# Patient Record
Sex: FEMALE | Race: ASIAN | Employment: FULL TIME | URBAN - METROPOLITAN AREA
[De-identification: names, ages, dates, MRNs, and addresses within clinical notes are randomized per-mention and may not be internally consistent; named-entity substitution may affect disease eponyms.]

---

## 2017-03-01 ENCOUNTER — TRANSFERRED RECORDS (OUTPATIENT)
Dept: HEALTH INFORMATION MANAGEMENT | Facility: CLINIC | Age: 57
End: 2017-03-01

## 2017-03-02 ENCOUNTER — TELEPHONE (OUTPATIENT)
Dept: FAMILY MEDICINE | Facility: CLINIC | Age: 57
End: 2017-03-02

## 2017-03-02 NOTE — LETTER
April 5, 2017      Veronika Lucia  [address] [facility] [address]        Dear Ms. Veronika Rea,    After your last visit we sent a Release of Information to [facility] to get your colonoscopy result.  They sent us a response that you have not been seen by them and so no result is available.  Could you please try to remember or find out where you had the test done.  If you could either let us know or contact them to gets the results we would appreciate it.    Thanks for your time and consideration in this matter.     Sincerely,     Mechelle Montgomery MD

## 2017-03-02 NOTE — TELEPHONE ENCOUNTER
Call Regarding Preventive Health Screening Colonoscopy    Attempt 1    Message on voicemail     Comments:       Outreach   Renae Morrison

## 2017-03-17 ENCOUNTER — HOSPITAL ENCOUNTER (OUTPATIENT)
Dept: MAMMOGRAPHY | Facility: CLINIC | Age: 57
Discharge: HOME | End: 2017-03-17
Attending: INTERNAL MEDICINE | Admitting: INTERNAL MEDICINE
Payer: COMMERCIAL

## 2017-03-17 DIAGNOSIS — Z12.31 ENCOUNTER FOR SCREENING MAMMOGRAM FOR HIGH-RISK PATIENT: ICD-10-CM

## 2017-03-17 PROCEDURE — G0202 SCR MAMMO BI INCL CAD: HCPCS | Mod: 52

## 2017-03-28 ENCOUNTER — OFFICE VISIT (OUTPATIENT)
Dept: FAMILY MEDICINE | Facility: CLINIC | Age: 57
End: 2017-03-28
Payer: COMMERCIAL

## 2017-03-28 VITALS
SYSTOLIC BLOOD PRESSURE: 122 MMHG | DIASTOLIC BLOOD PRESSURE: 68 MMHG | HEIGHT: 58 IN | WEIGHT: 127.7 LBS | RESPIRATION RATE: 15 BRPM | BODY MASS INDEX: 26.8 KG/M2 | OXYGEN SATURATION: 100 % | TEMPERATURE: 97.6 F | HEART RATE: 71 BPM

## 2017-03-28 DIAGNOSIS — M81.0 OSTEOPOROSIS: ICD-10-CM

## 2017-03-28 DIAGNOSIS — Z00.00 ROUTINE GENERAL MEDICAL EXAMINATION AT A HEALTH CARE FACILITY: Primary | ICD-10-CM

## 2017-03-28 DIAGNOSIS — E03.9 HYPOTHYROIDISM, UNSPECIFIED TYPE: ICD-10-CM

## 2017-03-28 DIAGNOSIS — K21.9 GASTROESOPHAGEAL REFLUX DISEASE, ESOPHAGITIS PRESENCE NOT SPECIFIED: ICD-10-CM

## 2017-03-28 DIAGNOSIS — Z85.3 PERSONAL HISTORY OF MALIGNANT NEOPLASM OF BREAST: ICD-10-CM

## 2017-03-28 DIAGNOSIS — Z13.6 CARDIOVASCULAR SCREENING; LDL GOAL LESS THAN 160: ICD-10-CM

## 2017-03-28 DIAGNOSIS — K22.70 BARRETT'S ESOPHAGUS WITHOUT DYSPLASIA: ICD-10-CM

## 2017-03-28 DIAGNOSIS — C50.911 MALIGNANT NEOPLASM OF RIGHT FEMALE BREAST, UNSPECIFIED SITE OF BREAST: ICD-10-CM

## 2017-03-28 DIAGNOSIS — M25.562 LEFT KNEE PAIN, UNSPECIFIED CHRONICITY: ICD-10-CM

## 2017-03-28 LAB
ALBUMIN SERPL-MCNC: 3.8 G/DL (ref 3.4–5)
ALP SERPL-CCNC: 82 U/L (ref 40–150)
ALT SERPL W P-5'-P-CCNC: 20 U/L (ref 0–50)
ANION GAP SERPL CALCULATED.3IONS-SCNC: 8 MMOL/L (ref 3–14)
AST SERPL W P-5'-P-CCNC: 14 U/L (ref 0–45)
BILIRUB SERPL-MCNC: 0.3 MG/DL (ref 0.2–1.3)
BUN SERPL-MCNC: 10 MG/DL (ref 7–30)
CALCIUM SERPL-MCNC: 9.2 MG/DL (ref 8.5–10.1)
CHLORIDE SERPL-SCNC: 107 MMOL/L (ref 94–109)
CHOLEST SERPL-MCNC: 216 MG/DL
CO2 SERPL-SCNC: 26 MMOL/L (ref 20–32)
CREAT SERPL-MCNC: 0.76 MG/DL (ref 0.52–1.04)
GFR SERPL CREATININE-BSD FRML MDRD: 77 ML/MIN/1.7M2
GLUCOSE SERPL-MCNC: 92 MG/DL (ref 70–99)
HDLC SERPL-MCNC: 40 MG/DL
LDLC SERPL CALC-MCNC: 131 MG/DL
NONHDLC SERPL-MCNC: 176 MG/DL
POTASSIUM SERPL-SCNC: 3.9 MMOL/L (ref 3.4–5.3)
PROT SERPL-MCNC: 7.7 G/DL (ref 6.8–8.8)
SODIUM SERPL-SCNC: 141 MMOL/L (ref 133–144)
T4 FREE SERPL-MCNC: 1.14 NG/DL (ref 0.76–1.46)
TRIGL SERPL-MCNC: 227 MG/DL
TSH SERPL DL<=0.05 MIU/L-ACNC: 1.84 MU/L (ref 0.4–4)

## 2017-03-28 PROCEDURE — 84443 ASSAY THYROID STIM HORMONE: CPT | Performed by: INTERNAL MEDICINE

## 2017-03-28 PROCEDURE — 99396 PREV VISIT EST AGE 40-64: CPT | Performed by: INTERNAL MEDICINE

## 2017-03-28 PROCEDURE — 80061 LIPID PANEL: CPT | Performed by: INTERNAL MEDICINE

## 2017-03-28 PROCEDURE — 84439 ASSAY OF FREE THYROXINE: CPT | Performed by: INTERNAL MEDICINE

## 2017-03-28 PROCEDURE — 36415 COLL VENOUS BLD VENIPUNCTURE: CPT | Performed by: INTERNAL MEDICINE

## 2017-03-28 PROCEDURE — 99213 OFFICE O/P EST LOW 20 MIN: CPT | Mod: 25 | Performed by: INTERNAL MEDICINE

## 2017-03-28 PROCEDURE — 80053 COMPREHEN METABOLIC PANEL: CPT | Performed by: INTERNAL MEDICINE

## 2017-03-28 RX ORDER — LEVOTHYROXINE SODIUM 75 UG/1
75 TABLET ORAL DAILY
Qty: 90 TABLET | Refills: 1 | Status: SHIPPED | OUTPATIENT
Start: 2017-03-28 | End: 2017-10-09

## 2017-03-28 NOTE — NURSING NOTE
"Chief Complaint   Patient presents with     Physical     Thyroid Problem       Initial /68 (BP Location: Left arm, Patient Position: Chair, Cuff Size: Adult Large)  Pulse 71  Temp 97.6  F (36.4  C) (Oral)  Resp 15  Ht 4' 10\" (1.473 m)  Wt 127 lb 11.2 oz (57.9 kg)  SpO2 100%  BMI 26.69 kg/m2 Estimated body mass index is 26.69 kg/(m^2) as calculated from the following:    Height as of this encounter: 4' 10\" (1.473 m).    Weight as of this encounter: 127 lb 11.2 oz (57.9 kg).  Medication Reconciliation: complete    "

## 2017-03-28 NOTE — MR AVS SNAPSHOT
After Visit Summary   3/28/2017    Veronika Lucia    MRN: [id number]           Patient Information     Date Of Birth          1960        Visit Information        Provider Department      3/28/2017 7:30 AM Mechelle Montgomery MD [facility]        Today's Diagnoses     Routine general medical examination at a health care facility    -  1    Hypothyroidism, unspecified type        Gastroesophageal reflux disease, esophagitis presence not specified        Curran's esophagus without dysplasia        Personal history of malignant neoplasm of breast        CARDIOVASCULAR SCREENING; LDL GOAL LESS THAN 160        Left knee pain, unspecified chronicity        Osteoporosis          Care Instructions      Preventive Health Recommendations  Female Ages 50 - 64    Yearly exam: See your health care provider every year in order to  o Review health changes.   o Discuss preventive care.    o Review your medicines if your doctor has prescribed any.      Get a Pap test every three years (unless you have an abnormal result and your provider advises testing more often).    If you get Pap tests with HPV test, you only need to test every 5 years, unless you have an abnormal result.     You do not need a Pap test if your uterus was removed (hysterectomy) and you have not had cancer.    You should be tested each year for STDs (sexually transmitted diseases) if you're at risk.     Have a mammogram every 1 to 2 years.    Have a colonoscopy at age 50, or have a yearly FIT test (stool test). These exams screen for colon cancer.      Have a cholesterol test every 5 years, or more often if advised.    Have a diabetes test (fasting glucose) every three years. If you are at risk for diabetes, you should have this test more often.     If you are at risk for osteoporosis (brittle bone disease), think about having a bone density scan (DEXA).    Shots: Get a flu shot each year. Get a tetanus shot every 10  years.    Nutrition:     Eat at least 5 servings of fruits and vegetables each day.    Eat whole-grain bread, whole-wheat pasta and brown rice instead of white grains and rice.    Talk to your provider about Calcium and Vitamin D.     Lifestyle    Exercise at least 150 minutes a week (30 minutes a day, 5 days a week). This will help you control your weight and prevent disease.    Limit alcohol to one drink per day.    No smoking.     Wear sunscreen to prevent skin cancer.     See your dentist every six months for an exam and cleaning.    See your eye doctor every 1 to 2 years.          Follow-ups after your visit        Future tests that were ordered for you today     Open Future Orders        Priority Expected Expires Ordered    DX Hip/Pelvis/Spine Routine  3/28/2018 3/28/2017            Who to contact     If you have questions or need follow up information about today's clinic visit or your schedule please contact [facility] directly at [phone].  Normal or non-critical lab and imaging results will be communicated to you by [employer], letter or phone within 4 business days after the clinic has received the results. If you do not hear from us within 7 days, please contact the clinic through [employer] or phone. If you have a critical or abnormal lab result, we will notify you by phone as soon as possible.  Submit refill requests through [employer] or call your pharmacy and they will forward the refill request to us. Please allow 3 business days for your refill to be completed.          Additional Information About Your Visit        [employer] Information     [employer] gives you secure access to your electronic health record. If you see a primary care provider, you can also send messages to your care team and make appointments. If you have questions, please call your primary care clinic.  If you do not have a primary care provider, please call [phone] and they will assist you.        Care EveryWhere ID   "   This is your Care EveryWhere ID. This could be used by other organizations to access your [facility] medical records  [id number]        Your Vitals Were     Pulse Temperature Respirations Height Pulse Oximetry BMI (Body Mass Index)    71 97.6  F (36.4  C) (Oral) 15 4' 10\" (1.473 m) 100% 26.69 kg/m2       Blood Pressure from Last 3 Encounters:   03/28/17 122/68   03/15/16 120/70   12/16/15 110/63    Weight from Last 3 Encounters:   03/28/17 127 lb 11.2 oz (57.9 kg)   03/15/16 130 lb 4.8 oz (59.1 kg)   12/16/15 128 lb (58.1 kg)              We Performed the Following     Comprehensive metabolic panel     Lipid panel reflex to direct LDL     T4, free     TSH          Today's Medication Changes          These changes are accurate as of: 3/28/17  8:14 AM.  If you have any questions, ask your nurse or doctor.               Stop taking these medicines if you haven't already. Please contact your care team if you have questions.     omeprazole 20 MG tablet   Stopped by:  Mechelle Montgomery MD                Where to get your medicines      These medications were sent to [facility] [address] [facility] [address] [facility] [address] [facility] [address] [facility]  [address] [facility] [address]     Phone:  [phone]     levothyroxine 75 MCG tablet                Primary Care Provider Office Phone # Fax #    Mechelle Montgomery [phone]       [facility] [address] [address] [facility] [address]        Thank you!     Thank you for choosing [facility]  for your care. Our goal is always to provide you with excellent care. Hearing back from our patients is one way we can continue to improve our services. Please take a few minutes to complete the written survey that you may receive in the mail after your visit with us. Thank you!             Your Updated Medication List - Protect others around you: Learn how to safely use, store and throw away your " medicines at www.disposemymeds.org.          This list is accurate as of: 3/28/17  8:14 AM.  Always use your most recent med list.                   Brand Name Dispense Instructions for use    cholecalciferol 5000 UNITS Caps capsule    vitamin D3    100 capsule    Take 1 capsule (5,000 Units) by mouth daily       LANSOPRAZOLE PO      Take 30 mg by mouth every morning (before breakfast)       levothyroxine 75 MCG tablet    SYNTHROID/LEVOTHROID    90 tablet    Take 1 tablet (75 mcg) by mouth daily

## 2017-03-28 NOTE — PROGRESS NOTES
SUBJECTIVE:     CC: Veronika Lucia is an 56 year old woman who presents for preventive health visit.   She also has several chronic conditions to follow up, monitor medications, labs and  further evaluation ( EGD).    Healthy Habits:    Do you get at least three servings of calcium containing foods daily (dairy, green leafy vegetables, etc.)? Yes     Amount of exercise or daily activities, outside of work: 0 day(s) per week    Problems taking medications regularly No    Medication side effects: No    Have you had an eye exam in the past two years? Yes     Do you see a dentist twice per year? Yes  Do you have sleep apnea, excessive snoring or daytime drowsiness? No     Barretts Esophagus  EGD 3/13/15 - gastritis and Curran's esophagus detected, was recommended repeat EGD 1 year, POSITIVE for intermittent gas and pain to chest prior to eating - use of Prevacid helpful, intermittent use of Tums after eating certain foods    Hypothyroidism Follow-up      Since last visit, patient describes the following symptoms: Weight stable, no hair loss, no skin changes, no constipation, no loose stools  TSH   Date Value Ref Range Status   03/15/2016 1.83 0.40 - 4.00 mU/L Final     T4 Free   Date Value Ref Range Status   03/15/2016 1.21 0.76 - 1.46 ng/dL Final        Today's PHQ-2 Score:   PHQ-2 ( 1999 [employer]) 3/28/2017 3/15/2016   Q1: Little interest or pleasure in doing things 0 0   Q2: Feeling down, depressed or hopeless 0 0   PHQ-2 Score 0 0   Abuse: Current or Past (Physical, Sexual or Emotional) - No  Do you feel safe in your environment - Yes    Social History   Substance Use Topics     Smoking status: Never Smoker     Smokeless tobacco: Never Used     Alcohol use No     The patient does not drink >3 drinks per day nor >7 drinks per week.    Recent Labs   Lab Test  03/15/16   0843  02/12/15   0849  02/14/14   0807   CHOL  215*  252*  230*   HDL  43*  56  46*   LDL  129*  150*  151*   TRIG  215*  228*  165*   CHOLHDLRATIO    --   4.5  5.0   [facility]  172*   --    --      Reviewed orders with patient. Reviewed health maintenance and updated orders accordingly - Yes    Mammo Decision Support:  Patient over age 50, mutual decision to screen reflected in health maintenance.  Pertinent mammograms are reviewed under the imaging tab.    History of abnormal Pap smear: Status post benign hysterectomy. Health Maintenance and Surgical History updated.    Reviewed and updated as needed this visit by clinical staff  Tobacco  Allergies  Meds  Problems  Med Hx  Surg Hx  Fam Hx  Soc Hx        Reviewed and updated as needed this visit by Provider  Allergies  Meds  Problems        Past Medical History:   Diagnosis Date     Breast cancer (H) 2007    right mastectomy, Tamoxifen for 5 years; ; Dr. Denton     DVT (deep venous thrombosis) (H) 2009    left distal leg; following fracture/immobility; was also on Tamoxifen.     Thyroid disease 2000    Replacement therapy      Past Surgical History:   Procedure Laterality Date     HYSTERECTOMY TOTAL ABDOMINAL, BILATERAL SALPINGO-OOPHORECTOMY, COMBINED  2010    Dr. Turner fibroids; [facility]     obgyn  2007    right sided mastectomy      ROS:   CONSTITUTIONAL: NEGATIVE for fever, chills, change in weight  ENT: NEGATIVE for ear, mouth and throat problems  RESP: NEGATIVE for significant cough or SOB  BREAST: NEGATIVE for masses, tenderness or discharge  CV: NEGATIVE for chest pain, palpitations or peripheral edema  GI: EGD 3/13/15 - gastritis and Curran's esophagus detected, was recommended repeat EGD 1 year, POSITIVE for intermittent gas and pain to chest prior to eating - use of Prevacid helpful, intermittent use of Tums after eating certain foods, NEGATIVE for nausea, abdominal pain, or change in bowel habits  MUSCULOSKELETAL: POSITIVE for intermittent left knee pain with radiation to calf, NEGATIVE for significant arthralgias or myalgia  NEURO: NEGATIVE for weakness, dizziness or paresthesias  ENDOCRINE:  "Hypothyroidism - use of levothyroxine, NEGATIVE for temperature intolerance, skin/hair changes  HEME/ALLERGY/IMMUNE: NEGATIVE for bleeding problems  PSYCHIATRIC: NEGATIVE for changes in mood or affect     This document serves as a record of the services and decisions personally performed and made by Mechelle Montgomery MD. It was created on her behalf by Verona Hathaway, a trained medical scribe. The creation of this document is based on the provider's statements to the medical scribe.   Vernoa Hathaway, 7:54 AM, March 28, 2017    Problem list, Medication list, Allergies, and Medical/Social/Surgical histories reviewed in [facility] and updated as appropriate.  Labs reviewed in EPIC  BP Readings from Last 3 Encounters:   03/28/17 122/68   03/15/16 120/70   12/16/15 110/63    Wt Readings from Last 3 Encounters:   03/28/17 127 lb 11.2 oz (57.9 kg)   03/15/16 130 lb 4.8 oz (59.1 kg)   12/16/15 128 lb (58.1 kg)        OBJECTIVE:     /68 (BP Location: Left arm, Patient Position: Chair, Cuff Size: Adult Large)  Pulse 71  Temp 97.6  F (36.4  C) (Oral)  Resp 15  Ht 4' 10\" (1.473 m)  Wt 127 lb 11.2 oz (57.9 kg)  SpO2 100%  BMI 26.69 kg/m2  EXAM:  GENERAL APPEARANCE: healthy, alert and no distress  HENT: ear canals and TM's normal, nose and mouth without ulcers or lesions, oral mucous membranes moist and oropharynx clear  NECK: no adenopathy, thyroid normal to palpation and no bruits  RESP: lungs clear to auscultation - no rales, rhonchi or wheezes  BREAST: right sided chest wall consistent with mastectomy; left breast and chest wall is normal without masses, tenderness or nipple discharge and no palpable axillary masses or adenopathy  CV: regular rates and rhythm and normal S1 S2, no peripheral edema  LYMPHATICS: normal ant/post cervical and supraclavicular nodes  ABDOMEN: soft, nontender, without hepatosplenomegaly or masses and bowel sounds normal  MS: mild increased tone to left back, FROM to left knee, no effusion, extremities " normal- no gross deformities noted, normal alignment on forward bending, straight leg raise negative bilaterally  NEURO: Normal strength and tone, mentation intact, speech normal, gait normal including heel/toe/tandem walking and Romberg negative  PSYCH: mentation appears normal and affect normal/bright    ASSESSMENT/PLAN:     (Z00.00) Routine general medical examination at a health care facility (primary encounter diagnosis)  Comment: HEALTH CARE MAINTENANCE and immunizations reviewed, clinical breast exam completed, annual mammography and breast exams, recent colonoscopy - results to be obtained, immunizations up to date  Plan:     (E03.9) Hypothyroidism, unspecified type  Comment: Clinically euthyroid; labs today, consider dose adjustment if labs warrant   Plan: TSH, T4, free, levothyroxine         (SYNTHROID/LEVOTHROID) 75 MCG tablet          (K21.9) Gastroesophageal reflux disease, esophagitis presence not specified  Comment: chart reviewed including EGD done in [place] on 3/13/15. mild erosive changes noted, Curran's esophagus ( reviewed pathology), use of Prevacid helpful  Plan: GI recommended follow up EGD in 2 years; pt has family member who is a gastroenterologist in [place], [place] area area; she will contact them  Pt reminded of benefits of maximizing calcium and vit D to help with bone health (due to being on PPI)    (K22.70) Curran's esophagus without dysplasia  Comment: 3/13/2015 [facility] [place], [place]  reviewed pathology, Curran's without dysplasia, she is now on Prevacid 30 mg per day which is helpful with intermittent use of Tums; was recommended repeat EGD 1 year  Plan: GI recommended follow up EGD in 2 years; pt has family member who is a gastroenterologist in [facility] area; she will contact them  Pt reminded of benefits of maximizing calcium and vit D to help with bone health (due to being on PPI)    (Z85.3) Personal history of malignant neoplasm of breast  Comment:  "2/2007  DCIS, Stage 1; right mastecotmy; Tamoxifen 5 years;  [facility] - Dr. Denton  ER/MT + Her2 -, FISH -  Plan: DX Hip/Pelvis/Spine          (Z13.6) CARDIOVASCULAR SCREENING; LDL GOAL LESS THAN 160  Comment: LDL at goal in the past, due for fasting labs  Lab Results   Component Value Date     03/15/2016     02/12/2015   Plan: Comprehensive metabolic panel, Lipid panel         reflex to direct LDL         (M25.562) Left knee pain, unspecified chronicity  Comment: no trauma; no effusion; FROM  Plan: recommend quad strengthening exercises both knees    (M81.0) Osteoporosis  Comment: DEXA 2015 - osteoporosis, degenerative changes of lumbar spine; she has been on Tamoxifen for 5 years, additive risk for osteoporosis;  recommended to ensure adequate calcium and vitamin D; recommended follow-up 2 years (2017)  Plan: DX Hip/Pelvis/Spine          (C50.911) Malignant neoplasm of right female breast, unspecified site of breast (H)  Comment: right mastectomy 2007; yearly exams with Dr. Denton [place]; annual mammography  Plan:     COUNSELING:   Reviewed preventive health counseling, as reflected in patient instructions  Special attention given to:        Regular exercise       Healthy diet/nutrition       Osteoporosis Prevention/Bone Health     reports that she has never smoked. She has never used smokeless tobacco.    Estimated body mass index is 26.69 kg/(m^2) as calculated from the following:    Height as of this encounter: 4' 10\" (1.473 m).    Weight as of this encounter: 127 lb 11.2 oz (57.9 kg).   Weight management plan: encouraged healthy diet and regular activity    Counseling Resources:  ATP IV Guidelines  Pooled Cohorts Equation Calculator  Breast Cancer Risk Calculator  FRAX Risk Assessment  ICSI Preventive Guidelines  Dietary Guidelines for Americans, 2010  USDA's MyPlate  ASA Prophylaxis  Lung CA Screening    Mechelle Montgomery MD  Internal Medicine  [facility] [place]  15 minutes in addition " to HEALTH CARE MAINTENANCE are spent with patient, over 50% of that time spent providing counselling, discussing and reviewing medical conditions/concerns, meds and potential side effects.     The information in this document, created by a medical scribe for me, accurately reflects the services I personally performed and the decisions made by me. I have reviewed and approved this document for accuracy.  Dr. Mechelle Montgomery, 8:16 AM, March 28, 2017

## 2017-04-02 NOTE — PROGRESS NOTES
If risk greater than 7.5%, then statin therapy should be considered.   The 10-year ASCVD risk score (Reva BRUSH Jr, et al., 2013) is: 3.4%    Values used to calculate the score:      Age: 56 years      Sex: Female      Is Non- : No      Diabetic: No      Tobacco smoker: No      Systolic Blood Pressure: 122 mmHg      Is BP treated: No      HDL Cholesterol: 40 mg/dL      Total Cholesterol: 216 mg/dL  Low risk, no treatment for cholesterol recommended.  Labs reviewed. Continue current meds and healthy lifestyle choices; diet and exercise.   Pt advised via My Chart.

## 2017-04-06 DIAGNOSIS — E03.9 HYPOTHYROIDISM, UNSPECIFIED TYPE: ICD-10-CM

## 2017-04-06 RX ORDER — LEVOTHYROXINE SODIUM 75 UG/1
TABLET ORAL
Qty: 90 TABLET | Refills: 0 | OUTPATIENT
Start: 2017-04-06

## 2017-04-06 NOTE — TELEPHONE ENCOUNTER
levothyroxine (SYNTHROID/LEVOTHROID) 75 MCG     Last Written Prescription Date: 3/28/17  Last Quantity: 90, # refills: 1  Last Office Visit with [facility], [facility] or [facility] prescribing provider: 3/28/17        TSH   Date Value Ref Range Status   03/28/2017 1.84 0.40 - 4.00 mU/L Final

## 2017-05-08 ENCOUNTER — TELEPHONE (OUTPATIENT)
Dept: BONE DENSITY | Facility: CLINIC | Age: 57
End: 2017-05-08

## 2017-06-22 ENCOUNTER — TELEPHONE (OUTPATIENT)
Dept: BONE DENSITY | Facility: CLINIC | Age: 57
End: 2017-06-22

## 2017-07-06 ENCOUNTER — TELEPHONE (OUTPATIENT)
Dept: BONE DENSITY | Facility: CLINIC | Age: 57
End: 2017-07-06

## 2017-09-13 ENCOUNTER — OFFICE VISIT (OUTPATIENT)
Dept: FAMILY MEDICINE | Facility: CLINIC | Age: 57
End: 2017-09-13
Payer: COMMERCIAL

## 2017-09-13 VITALS
HEART RATE: 67 BPM | TEMPERATURE: 98.5 F | DIASTOLIC BLOOD PRESSURE: 60 MMHG | BODY MASS INDEX: 27.66 KG/M2 | RESPIRATION RATE: 16 BRPM | HEIGHT: 58 IN | WEIGHT: 131.8 LBS | SYSTOLIC BLOOD PRESSURE: 112 MMHG | OXYGEN SATURATION: 99 %

## 2017-09-13 DIAGNOSIS — K21.9 GASTROESOPHAGEAL REFLUX DISEASE, ESOPHAGITIS PRESENCE NOT SPECIFIED: ICD-10-CM

## 2017-09-13 DIAGNOSIS — M79.644 THUMB PAIN, RIGHT: Primary | ICD-10-CM

## 2017-09-13 PROCEDURE — 99213 OFFICE O/P EST LOW 20 MIN: CPT | Performed by: INTERNAL MEDICINE

## 2017-09-13 RX ORDER — NAPROXEN 500 MG/1
500 TABLET ORAL 2 TIMES DAILY PRN
Qty: 30 TABLET | Refills: 1 | Status: SHIPPED | OUTPATIENT
Start: 2017-09-13 | End: 2018-02-21

## 2017-09-13 NOTE — NURSING NOTE
"Chief Complaint   Patient presents with     Thumb Discomfort     Right thumb decreased mobility and swelling over the knuckle   intermittent but has been bad since yesterday        Initial /60  Pulse 67  Temp 98.5  F (36.9  C) (Oral)  Resp 16  Ht 4' 9.5\" (1.461 m)  Wt 131 lb 12.8 oz (59.8 kg)  SpO2 99%  BMI 28.03 kg/m2 Estimated body mass index is 28.03 kg/(m^2) as calculated from the following:    Height as of this encounter: 4' 9.5\" (1.461 m).    Weight as of this encounter: 131 lb 12.8 oz (59.8 kg).  Medication Reconciliation: complete    "

## 2017-09-13 NOTE — MR AVS SNAPSHOT
After Visit Summary   9/13/2017    Veronika Lucia    MRN: [id number]           Patient Information     Date Of Birth          1960        Visit Information        Provider Department      9/13/2017 1:00 PM Mechelle Montgomery MD [facility]        Today's Diagnoses     Thumb pain, right    -  1    Gastroesophageal reflux disease, esophagitis presence not specified           Follow-ups after your visit        Additional Services     ORTHO  REFERRAL       [facility] is referring you to the Orthopedic  Services at [facility].       The  Representative will assist you in the coordination of your Orthopedic and Musculoskeletal Care as prescribed by your physician.    The  Representative will call you within 1 business day to help schedule your appointment, or you may contact the  Representative at:    All areas ~ [phone]     Type of Referral : Surgical / Specialist       Timeframe requested: hand specialist - per pt request    Hand surgeons at [facility]- [phone]  Hand surgeons:  Parth Amor, Beba Salinas, Marcial Ramirez    [phone] [facility]     Coverage of these services is subject to the terms and limitations of your health insurance plan.  Please call member services at your health plan with any benefit or coverage questions.      If X-rays, CT or MRI's have been performed, please contact the facility where they were done to arrange for , prior to your scheduled appointment.  Please bring this referral request to your appointment and present it to your specialist.                  Who to contact     If you have questions or need follow up information about today's clinic visit or your schedule please contact [facility] directly at [phone].  Normal or non-critical lab and imaging results will be communicated to you by MyChart, letter or  "phone within 4 business days after the clinic has received the results. If you do not hear from us within 7 days, please contact the clinic through Vint or phone. If you have a critical or abnormal lab result, we will notify you by phone as soon as possible.  Submit refill requests through Vint or call your pharmacy and they will forward the refill request to us. Please allow 3 business days for your refill to be completed.          Additional Information About Your Visit        [employer] Information     Vint gives you secure access to your electronic health record. If you see a primary care provider, you can also send messages to your care team and make appointments. If you have questions, please call your primary care clinic.  If you do not have a primary care provider, please call [phone] and they will assist you.        Care EveryWhere ID     This is your Care EveryWhere ID. This could be used by other organizations to access your [facility] medical records  [id number]        Your Vitals Were     Pulse Temperature Respirations Height Pulse Oximetry BMI (Body Mass Index)    67 98.5  F (36.9  C) (Oral) 16 4' 9.5\" (1.461 m) 99% 28.03 kg/m2       Blood Pressure from Last 3 Encounters:   09/13/17 112/60   03/28/17 122/68   03/15/16 120/70    Weight from Last 3 Encounters:   09/13/17 131 lb 12.8 oz (59.8 kg)   03/28/17 127 lb 11.2 oz (57.9 kg)   03/15/16 130 lb 4.8 oz (59.1 kg)              We Performed the Following     ORTHO  REFERRAL          Today's Medication Changes          These changes are accurate as of: 9/13/17  1:24 PM.  If you have any questions, ask your nurse or doctor.               Start taking these medicines.        Dose/Directions    diclofenac 1 % Gel topical gel   Commonly known as:  VOLTAREN   Used for:  Thumb pain, right, Gastroesophageal reflux disease, esophagitis presence not specified   Started by:  Mechelle Montgomery MD        Place onto the skin 4 times daily "   Quantity:  100 g   Refills:  0       naproxen 500 MG tablet   Commonly known as:  NAPROSYN   Used for:  Thumb pain, right   Started by:  Mechelle Montgomery MD        Dose:  500 mg   Take 1 tablet (500 mg) by mouth 2 times daily as needed for moderate pain   Quantity:  30 tablet   Refills:  1            Where to get your medicines      These medications were sent to [facility] [address] [place] [address] [facility] AT [facility] [address] [facility]  [address] [facility] [address]     Phone:  [phone]     naproxen 500 MG tablet         Some of these will need a paper prescription and others can be bought over the counter.  Ask your nurse if you have questions.     Bring a paper prescription for each of these medications     diclofenac 1 % Gel topical gel                Primary Care Provider Office Phone # Fax #    Mechelle Montgomery [phone] [address]  [facility] [address]        Equal Access to Services     [facility] Center[facility]ILES AH: Hadii aad ku hadasho Soomaali, waaxda luqadaha, qaybta kaalmada adeegyada, waxay idiin hayaan adeeg kharash la'alisa ah. So [facility] [phone].    ATENCIÓN: Si habla español, tiene a woodward disposición servicios gratuitos de asistencia lingüística. Llame al [phone].    We comply with applicable federal civil rights laws and [place] laws. We do not discriminate on the basis of race, color, national origin, age, disability sex, sexual orientation or gender identity.            Thank you!     Thank you for choosing [facility]  for your care. Our goal is always to provide you with excellent care. Hearing back from our patients is one way we can continue to improve our services. Please take a few minutes to complete the written survey that you may receive in the mail after your visit with us. Thank you!             Your Updated Medication List - Protect others around you: Learn how to safely use, store and throw away  your medicines at www.disposemymeds.org.          This list is accurate as of: 9/13/17  1:24 PM.  Always use your most recent med list.                   Brand Name Dispense Instructions for use Diagnosis    diclofenac 1 % Gel topical gel    VOLTAREN    100 g    Place onto the skin 4 times daily    Thumb pain, right, Gastroesophageal reflux disease, esophagitis presence not specified       LANSOPRAZOLE PO      Take 30 mg by mouth every morning (before breakfast)        levothyroxine 75 MCG tablet    SYNTHROID/LEVOTHROID    90 tablet    Take 1 tablet (75 mcg) by mouth daily    Hypothyroidism, unspecified type       naproxen 500 MG tablet    NAPROSYN    30 tablet    Take 1 tablet (500 mg) by mouth 2 times daily as needed for moderate pain    Thumb pain, right

## 2017-09-13 NOTE — PROGRESS NOTES
SUBJECTIVE:   Veronika Lucia is a 57 year old female who presents to clinic today for the following health issues:    Joint or Musculoskeletal Pain: Patient reports that she has experienced intermittent pain in her right thumb for approximately one month now, but over the past 24 hours the pain is persistent and gotten worse. She explains and shows that she has minimal movement of her thumb, partially due to pain. She feels as if the pain is either in her joint or tendon. She has tried Tylenol, but found minimal relief. She is recommended to try using ice and heat to help the thumb heal and to use Ibuprofen as needed, along with naproxen and a topical gel.    Problem list and histories reviewed & adjusted, as indicated.  Additional history: as documented    Labs reviewed in EPIC    Reviewed and updated as needed this visit by clinical staff  Tobacco  Allergies  Meds  Med Hx  Surg Hx  Fam Hx  Soc Hx      Reviewed and updated as needed this visit by Provider       ROS:  CONSTITUTIONAL:NEGATIVE for fever, chills, change in weight  RESP:NEGATIVE for significant cough or SOB  BREAST: Hx of breast cancer - right mastectomy (2007); NEGATIVE for masses, tenderness or discharge  CV: NEGATIVE for chest pain, palpitations or peripheral edema  GI: EGD 3/13/15 - gastritis and Curran's esophagus detected, was recommended repeat EGD 1 year; EGD done 12/12/2015 reviewed; no dysplasia and no evidence of Barrettes Esophagus;  POSITIVE for intermittent gas and chest pain - use of chronic PPI use;  MUSCULOSKELETAL: POSITIVE for R thumb pain; Osteoporosis - use of cholecalciferol; NEGATIVE for significant arthralgias or myalgia  NEURO: NEGATIVE for weakness, dizziness or paresthesias  ENDOCRINE: Hypothyroidism - use of levothyroxine; NEGATIVE for temperature intolerance, skin/hair changes  PSYCHIATRIC: NEGATIVE for changes in mood or affect    This document serves as a record of the services and decisions personally performed  "and made by Mechelle Montgomery MD. It was created on her behalf by Honey Juan, a trained medical scribe. The creation of this document is based on the provider's statements to the medical scribe.   Honey Juan, 1:16 PM, September 13, 2017    OBJECTIVE:     /60  Pulse 67  Temp 98.5  F (36.9  C) (Oral)  Resp 16  Ht 4' 9.5\" (1.461 m)  Wt 131 lb 12.8 oz (59.8 kg)  SpO2 99%  BMI 28.03 kg/m2  Body mass index is 28.03 kg/(m^2).     EXAM:  GENERAL: healthy, alert and no distress  RESP: lungs clear to auscultation - no rales, rhonchi or wheezes  CV: regular rate and rhythm, normal S1 S2, no murmur, no peripheral edema and peripheral pulses strong  GI: soft, nontender; no epigastric tenderness  MS: minimal R thumb movement - no erythema or warmth; no gross musculoskeletal defects noted, no edema  NEURO: Normal strength and tone, mentation intact and speech normal    Labs:   Lab Results   Component Value Date    CR 0.76 03/28/2017         ASSESSMENT/PLAN:   (M79.644) Thumb pain, right  (primary encounter diagnosis  Comment:discussed possible X-ray of right thumb joint today to look for erosion an degenerative changes; follow with hand specialist in [place] if no improvement within a couple of days; if needed, could consider XRay at that time.  Renal function OK; ok to use short term NSAIDS  Plan: naproxen (NAPROSYN) 500 MG tablet, diclofenac         (VOLTAREN) 1 % GEL topical gel, ORTHO         REFERRAL    (K21.9) Gastroesophageal reflux disease, esophagitis presence not specified  Comment: on PPI, take along with NSAID.  Plan: diclofenac (VOLTAREN) 1 % GEL topical gel; Pt reminded of benefits of maximizing calcium and vit D to help with bone health (due to being on PPI)      Hx of Curran's Esophagus  Chronic use of PPI; EGD 3/2015 and 12/12/2015  Pt reminded of benefits of maximizing calcium and vit D to help with bone health (due to being on PPI)      MEDICATIONS:   Orders Placed This " Encounter   Medications     naproxen (NAPROSYN) 500 MG tablet     Sig: Take 1 tablet (500 mg) by mouth 2 times daily as needed for moderate pain     Dispense:  30 tablet     Refill:  1     diclofenac (VOLTAREN) 1 % GEL topical gel     Sig: Place onto the skin 4 times daily     Dispense:  100 g     Refill:  0     Medications Discontinued During This Encounter   Medication Reason     cholecalciferol (VITAMIN D3) 5000 UNITS CAPS capsule Stopped by Patient     FUTURE APPOINTMENTS:       - Follow-up visit with hand specialist in [place] if no improvement of thumb.    Mechelle Montgomery MD  Internal Medicine  [facility]    The information in this document, created by a medical scribe for me, accurately reflects the services I personally performed and the decisions made by me. I have reviewed and approved this document for accuracy.  Dr. Mechelle Montgomery, 1:29 PM, September 13, 2017

## 2017-09-28 ENCOUNTER — TELEPHONE (OUTPATIENT)
Dept: FAMILY MEDICINE | Facility: CLINIC | Age: 57
End: 2017-09-28

## 2017-09-28 NOTE — TELEPHONE ENCOUNTER
Reason for call:  Form   Our goal is to have forms completed within 72 hours, however some forms may require a visit or additional information.     Who is the form from? Patient  Where did the form come from? Patient or family brought in     What clinic location was the form placed at? [facility]  Where was the form placed? 's Box  What number is listed as a contact on the form? [phone]    Phone call message - patient request for a letter, form or note:     Date needed: within one week  Please fax to [phone]  Has the patient signed a consent form for release of information? Not Applicable    Additional comments:     Type of letter, form or note: medical      Phone number to reach patient:  Cell number on file:    Telephone Information:   Mobile [phone]       Best Time:  Anytime    Can we leave a detailed message on this number?  YES

## 2017-10-09 DIAGNOSIS — E03.9 HYPOTHYROIDISM, UNSPECIFIED TYPE: ICD-10-CM

## 2017-10-10 RX ORDER — LEVOTHYROXINE SODIUM 75 UG/1
TABLET ORAL
Qty: 90 TABLET | Refills: 1 | Status: SHIPPED | OUTPATIENT
Start: 2017-10-10 | End: 2018-04-02

## 2017-10-10 NOTE — TELEPHONE ENCOUNTER
Prescription approved per [facility] Refill Protocol.  Sharifa Pereyra, RN  Triage Nurse

## 2017-10-10 NOTE — TELEPHONE ENCOUNTER
levothyroxine (SYNTHROID/LEVOTHROID) 75 MCG     Last Written Prescription Date: 3/28/17  Last Quantity: 90, # refills: 1  Last Office Visit with [facility], [facility] or [facility] prescribing provider: 9/13/17        TSH   Date Value Ref Range Status   03/28/2017 1.84 0.40 - 4.00 mU/L Final

## 2018-01-11 ENCOUNTER — TELEPHONE (OUTPATIENT)
Dept: FAMILY MEDICINE | Facility: CLINIC | Age: 58
End: 2018-01-11

## 2018-01-11 NOTE — TELEPHONE ENCOUNTER
Please abstract the following data from this visit with this patient into the appropriate field in Epic:    Colonoscopy done on this date: March 2017 (approximately), by this group: [facility] - unsure of exact location, results were normal.

## 2018-02-07 DIAGNOSIS — E03.9 HYPOTHYROIDISM, UNSPECIFIED TYPE: ICD-10-CM

## 2018-02-07 RX ORDER — LEVOTHYROXINE SODIUM 75 UG/1
TABLET ORAL
Qty: 90 TABLET | Refills: 1 | OUTPATIENT
Start: 2018-02-07

## 2018-02-07 NOTE — TELEPHONE ENCOUNTER
Sent back as duplicate, patient needs an appointment and annual labs  In March.  hSarifa Pereyra, RN  Triage Nurse

## 2018-02-20 ENCOUNTER — TELEPHONE (OUTPATIENT)
Dept: FAMILY MEDICINE | Facility: CLINIC | Age: 58
End: 2018-02-20

## 2018-02-20 NOTE — TELEPHONE ENCOUNTER
Spoke with patient concerning sciatica back pain. Was tx'd with prednisone and continues with pain. Also has had hive like rashes for past few months. Was better when taking prednisone for back pain.    Advised should keep appt tomorrow and discuss both issues with [facility].     Advised to control rash with benadryl if needed before appt.    Meseret Booth RN

## 2018-02-20 NOTE — TELEPHONE ENCOUNTER
Patient calling with question for Dr Montgomery's nurse. Patient would like a referral for physical therapy. She states that her insurance does not require a referral for PT and she is wondering if Dr Montgomery can place a referral for her. Patient scheduled an appointment with Shilpa for tomorrow afternoon incase she needs to be seen. She is also having a skin allergy concern. Please call her back before her appointment tomorrow afternoon.

## 2018-02-21 ENCOUNTER — OFFICE VISIT (OUTPATIENT)
Dept: FAMILY MEDICINE | Facility: CLINIC | Age: 58
End: 2018-02-21
Payer: COMMERCIAL

## 2018-02-21 VITALS
TEMPERATURE: 97.4 F | OXYGEN SATURATION: 96 % | BODY MASS INDEX: 28.77 KG/M2 | HEART RATE: 72 BPM | SYSTOLIC BLOOD PRESSURE: 118 MMHG | WEIGHT: 135.3 LBS | DIASTOLIC BLOOD PRESSURE: 78 MMHG

## 2018-02-21 DIAGNOSIS — M54.42 ACUTE LEFT-SIDED LOW BACK PAIN WITH LEFT-SIDED SCIATICA: ICD-10-CM

## 2018-02-21 DIAGNOSIS — L50.9 URTICARIA: Primary | ICD-10-CM

## 2018-02-21 PROCEDURE — 99213 OFFICE O/P EST LOW 20 MIN: CPT | Performed by: NURSE PRACTITIONER

## 2018-02-21 RX ORDER — MECOBALAMIN 5000 MCG
30 TABLET,DISINTEGRATING ORAL
COMMUNITY

## 2018-02-21 NOTE — MR AVS SNAPSHOT
After Visit Summary   2/21/2018    Veronika Lucia    MRN: [id number]           Patient Information     Date Of Birth          1960        Visit Information        Provider Department      2/21/2018 3:40 PM Shilpa Ganhdi Ra, APRN [facility]        Today's Diagnoses     Urticaria    -  1    Acute left-sided low back pain with left-sided sciatica          Care Instructions    Start physical therapy- if no improvement we should hear from you.    Schedule with the allergist.            Follow-ups after your visit        Additional Services     ALLERGY/ASTHMA ADULT REFERRAL       Your provider has referred you to: [facility]: [facility] [phone]   [email or web address]  [facility] [phone]   [email or web address]    Please be aware that coverage of these services is subject to the terms and limitations of your health insurance plan.  Call member services at your health plan with any benefit or coverage questions.      Please bring the following with you to your appointment:    (1) Any X-Rays, CTs or MRIs which have been performed.  Contact the facility where they were done to arrange for  prior to your scheduled appointment.    (2) List of current medications  (3) This referral request   (4) Any documents/labs given to you for this referral            [facility] PT, HAND, AND CHIROPRACTIC REFERRAL       **This order will print in the [facility] Scheduling Office**    Physical Therapy, Hand Therapy and Chiropractic Care are available through:    *[facility]  *[facility]  *[facility]    Call one number to schedule at any of the above locations: [phone].    Your provider has referred you to: Physical Therapy at [facility] or [facility]    Indication/Reason for Referral: Low Back Pain  Onset of Illness: 1 month  Therapy Orders: Evaluate and Treat  Special Programs: None  Special Request: None    Christopher White   "    Additional Comments for the Therapist or Chiropractor:     Please be aware that coverage of these services is subject to the terms and limitations of your health insurance plan.  Call member services at your health plan with any benefit or coverage questions.      Please bring the following to your appointment:    *Your personal calendar for scheduling future appointments  *Comfortable clothing                  Your next 10 appointments already scheduled     Mar 19, 2018  7:30 AM CDT   (Arrive by 7:15 AM)   MA SCREENING BILATERAL W/ HAWK with RHBCMA2   [facility] ([facility])    [address], [address]  [facility] [address]   [phone]           Three-dimensional (3D) mammograms are available at [place] locations in [facility], [facility], [place], [place], and [place]. [facility] locations include [place] and [facility] in [place]. Benefits of 3D mammograms include: - Improved rate of cancer detection - Decreases your chance of having to go back for more tests, which means fewer: - \"False-positive\" results (This means that there is an abnormal area but it isn't cancer.) - Invasive testing procedures, such as a biopsy or surgery - Can provide clearer images of the breast if you have dense breast tissue. 3D mammography is an optional exam that anyone can have with a 2D mammogram. It doesn't replace or take the place of a 2D mammogram. 2D mammograms remain an effective screening test for all women.  Not all insurance companies cover the cost of a 3D mammogram. Check with your insurance.            Apr 02, 2018  8:30 AM CDT   PHYSICAL with Mechelle Montgomery MD   [facility]    [address] [facility] [address] [phone]              Who to contact     If you have questions or need follow up information about today's clinic visit or your schedule please " contact [facility] directly at [phone].  Normal or non-critical lab and imaging results will be communicated to you by MyChart, letter or phone within 4 business days after the clinic has received the results. If you do not hear from us within 7 days, please contact the clinic through MyChart or phone. If you have a critical or abnormal lab result, we will notify you by phone as soon as possible.  Submit refill requests through Lunagames or call your pharmacy and they will forward the refill request to us. Please allow 3 business days for your refill to be completed.          Additional Information About Your Visit        [employer] Information     [employer] gives you secure access to your electronic health record. If you see a primary care provider, you can also send messages to your care team and make appointments. If you have questions, please call your primary care clinic.  If you do not have a primary care provider, please call [phone] and they will assist you.        Care EveryWhere ID     This is your Care EveryWhere ID. This could be used by other organizations to access your [place] medical records  [id number]        Your Vitals Were     Pulse Temperature Pulse Oximetry BMI (Body Mass Index)          72 97.4  F (36.3  C) (Oral) 96% 28.77 kg/m2         Blood Pressure from Last 3 Encounters:   02/21/18 118/78   09/13/17 112/60   03/28/17 122/68    Weight from Last 3 Encounters:   02/21/18 135 lb 4.8 oz (61.4 kg)   09/13/17 131 lb 12.8 oz (59.8 kg)   03/28/17 127 lb 11.2 oz (57.9 kg)              We Performed the Following     ALLERGY/ASTHMA ADULT REFERRAL     [facility] PT, HAND, AND CHIROPRACTIC REFERRAL        Primary Care Provider Office Phone # Fax #    Mechelle Montgomery [phone] [address]  [facility] [address]        Equal Access to Services     ALEC TOBAR AH: Hadii aad ku hadasho Soomaali, waaxda luqadaha, qaybta kaalcosmo bartholomew, trevor santos  katherin michelletrejose allisonaahussain ah. So [facility] [phone].    ATENCIÓN: Si habla heriberto, tiene a woodward disposición servicios gratuitos de asistencia lingüística. Benita al [phone].    We comply with applicable federal civil rights laws and [place] laws. We do not discriminate on the basis of race, color, national origin, age, disability, sex, sexual orientation, or gender identity.            Thank you!     [facility] you [facility]  for your [facility]. Our goal is always to provide you with excellent care. Hearing back from our patients is one way we can continue to improve our services. Please take a few minutes to complete the written survey that you may receive in the mail after your visit with us. Thank you!             Your Updated Medication List - Protect others around you: Learn how to safely use, store and throw away your medicines at www.disposemymeds.org.          This list is accurate as of 2/21/18  4:34 PM.  Always use your most recent med list.                   Brand Name Dispense Instructions for use Diagnosis    LANsoprazole 15 MG CR capsule    PREVACID     Take 30 mg by mouth        levothyroxine 75 MCG tablet    SYNTHROID/LEVOTHROID    90 tablet    TAKE 1 TABLET(75 MCG) BY MOUTH DAILY    Hypothyroidism, unspecified type

## 2018-02-21 NOTE — PROGRESS NOTES
SUBJECTIVE:   Veronika Lucia is a 57 year old female who presents to clinic today for the following health issues:      ED/UC Followup:    Facility:  [facility]  Date of visit: 2/13/8  Reason for visit: Left sciatic nerve pain  Current Status: Improved but not gone     Seen 2/13/18 for L side low back pain with radiculopathy.  Given steroids which have helped but still has some pain with radiculopathy into L thigh.  She does remember falling about 1 month ago on the driveway.  She had no residual symptoms prior to the recurrence after she was lifting a suitcase.  She again started having L sided low back pain.    She denies any fevers, weight loss.  No change in bowel or urinary habits.  No saddle anesthesia.    The symptoms have improved but are still present.  She denies chronic hx of of low back pain.    ALLERGIES      Duration: 2 months    Description:   Nasal congestion: no   Sneezing: no  Red, itchy eyes: no    Accompanying signs and symptoms: Skin condition-hives    History (similar episodes/allergy testing): None    Precipitating or alleviating factors: None    Therapies tried and outcome: Recent Prednisone has helped    Symptoms for the past 2 months.  Occurs most days.  Hives all over body.  She recently took a steroid for the back pain and this helped, no steroids for 2 days and symptoms have not returned.  She denies trouble breathing, swallowing.  No abdominal pain, n/v.  No change in medications.  No change in food.    Problem list and histories reviewed & adjusted, as indicated.  Additional history: as documented    Patient Active Problem List   Diagnosis     Hypothyroidism     Breast cancer (H)     CARDIOVASCULAR SCREENING; LDL GOAL LESS THAN 160     Vitamin D deficiency     Curran's esophagus     GERD (gastroesophageal reflux disease)     Osteoporosis     Past Surgical History:   Procedure Laterality Date     HYSTERECTOMY TOTAL ABDOMINAL, BILATERAL SALPINGO-OOPHORECTOMY, COMBINED  2010     Dr. Turner fibroids; [facility]     obgyn  2007    right sided mastectomy        Social History   Substance Use Topics     Smoking status: Never Smoker     Smokeless tobacco: Never Used     Alcohol use No     Family History   Problem Relation Age of Onset     CANCER Mother      oral cancer           Reviewed and updated as needed this visit by clinical staff       Reviewed and updated as needed this visit by Provider         ROS:  SEE HPI.    OBJECTIVE:     /78  Pulse 72  Temp 97.4  F (36.3  C) (Oral)  Wt 135 lb 4.8 oz (61.4 kg)  SpO2 96%  BMI 28.77 kg/m2  Body mass index is 28.77 kg/(m^2).  GENERAL: healthy, alert and no distress  RESP: lungs clear to auscultation - no rales, rhonchi or wheezes  CV: regular rates and rhythm, normal S1 S2, no S3 or S4 and no murmur, click or rub  MS: No spine tenderness.  Mild ttp L side surrounding SI joint.  + L side straight leg raise.  SKIN: no suspicious lesions or rashes  PSYCH: mentation appears normal, affect normal/bright    Diagnostic Test Results:  none     ASSESSMENT/PLAN:   1. Urticaria  57 y.o. Female, hx of hives x2 months.  Unknown etiology.  Resolved currently with recent medrol dosepak for back pain.  No further steroids, antihistamines - schedule with allergist.  Pt agrees with plan and verbalized understanding.  - ALLERGY/ASTHMA ADULT REFERRAL    2. Acute left-sided low back pain with left-sided sciatica  Start with PT, if no improvement will let us know.  - [facility] PT, HAND, AND CHIROPRACTIC REFERRAL    Shilpa Gandhi, APRN CNP  [facility]

## 2018-02-23 ENCOUNTER — THERAPY VISIT (OUTPATIENT)
Dept: PHYSICAL THERAPY | Facility: CLINIC | Age: 58
End: 2018-02-23
Payer: COMMERCIAL

## 2018-02-23 DIAGNOSIS — M54.42 ACUTE LEFT-SIDED LOW BACK PAIN WITH LEFT-SIDED SCIATICA: Primary | ICD-10-CM

## 2018-02-23 PROCEDURE — 97112 NEUROMUSCULAR REEDUCATION: CPT | Mod: GP | Performed by: PHYSICAL THERAPIST

## 2018-02-23 PROCEDURE — 97110 THERAPEUTIC EXERCISES: CPT | Mod: GP | Performed by: PHYSICAL THERAPIST

## 2018-02-23 PROCEDURE — 97161 PT EVAL LOW COMPLEX 20 MIN: CPT | Mod: GP | Performed by: PHYSICAL THERAPIST

## 2018-02-23 NOTE — MR AVS SNAPSHOT
"              After Visit Summary   2/23/2018    Veronika Lucia    MRN: [id number]           Patient Information     Date Of Birth          1960        Visit Information        Provider Department      2/23/2018 7:40 AM Selina Wright PT [facility]        Today's Diagnoses     Acute left-sided low back pain with left-sided sciatica    -  1       Follow-ups after your visit        Your next 10 appointments already scheduled     Feb 28, 2018  8:40 AM CST   [facility] Spine with Selina Wright PT   [facility] ([facility] [place])    [address]  [place] [address]   [phone]            Mar 19, 2018  7:30 AM CDT   (Arrive by 7:15 AM)   MA SCREENING BILATERAL W/ HAWK with [id number]   [facility] ([facility])    [address], [address]  [facility] [address]   [phone]           Three-dimensional (3D) mammograms are available at [place] locations in [facility], [place], [facility], [place], [place], and [place]. [facility] locations include [place] and [facility] in [place]. Benefits of 3D mammograms include: - Improved rate of cancer detection - Decreases your chance of having to go back for more tests, which means fewer: - \"False-positive\" results (This means that there is an abnormal area but it isn't cancer.) - Invasive testing procedures, such as a biopsy or surgery - Can provide clearer images of the breast if you have dense breast tissue. 3D mammography is an optional exam that anyone can have with a 2D mammogram. It doesn't replace or take the place of a 2D mammogram. 2D mammograms remain an effective screening test for all women.  Not all insurance companies cover the cost of a 3D mammogram. Check with your insurance.            Apr 02, 2018  8:30 AM CDT   PHYSICAL with Mechelle Montgomery MD   [facility] [place] ([facility])    [id number]  [place] [address] [phone]              Who to contact     If you have questions or need follow up information about today's clinic visit or your schedule please contact [facility] directly at [phone].  Normal or non-critical lab and imaging results will be communicated to you by MyChart, letter or phone within 4 business days after the clinic has received the results. If you do not hear from us within 7 days, please contact the clinic through NephroPlushart or phone. If you have a critical or abnormal lab result, we will notify you by phone as soon as possible.  Submit refill requests through INVERMART or call your pharmacy and they will forward the refill request to us. Please allow 3 business days for your refill to be completed.          Additional Information About Your Visit        MyChart Information     INVERMART gives you secure access to your electronic health record. If you see a primary care provider, you can also send messages to your care team and make appointments. If you have questions, please call your primary care clinic.  If you do not have a primary care provider, please call [phone] and they will assist you.        Care EveryWhere ID     This is your Care EveryWhere ID. This could be used by other organizations to access your [place] medical records  [id number]         Blood Pressure from Last 3 Encounters:   02/21/18 118/78   09/13/17 112/60   03/28/17 122/68    Weight from Last 3 Encounters:   02/21/18 61.4 kg (135 lb 4.8 oz)   09/13/17 59.8 kg (131 lb 12.8 oz)   03/28/17 57.9 kg (127 lb 11.2 oz)              We Performed the Following     HC PT EVAL, LOW COMPLEXITY     NEUROMUSCULAR RE-EDUCATION     THERAPEUTIC EXERCISES        Primary Care Provider Office Phone # Fax #    Mechelle Montgomery [phone] [address]  [facility] [address]        Equal Access to Services     ALEC TOBAR : Cesia summers  kayla Jose, alisa georgesmarieha, lucila oraciothaddeus tabbyjohn, trevor eddiein hayaahussain mcnairdominique vivianajairo laleonhussain nicky. So [facility] [phone].    ATENCIÓN: Si habla kateañol, tiene a woodward disposición servicios gratuitos de asistencia lingüística. Benita al [phone].    We comply with applicable federal civil rights laws and [place] laws. We do not discriminate on the basis of race, color, national origin, age, disability, sex, sexual orientation, or gender identity.            Thank you!     Thank you for choosing [facility]   for your care. Our goal is always to provide you with excellent care. Hearing back from our patients is one way we can continue to improve our services. Please take a few minutes to complete the written survey that you may receive in the mail after your visit with us. Thank you!             Your Updated Medication List - Protect others around you: Learn how to safely use, store and throw away your medicines at www.disposemymeds.org.          This list is accurate as of 2/23/18  8:24 AM.  Always use your most recent med list.                   Brand Name Dispense Instructions for use Diagnosis    LANsoprazole 15 MG CR capsule    PREVACID     Take 30 mg by mouth        levothyroxine 75 MCG tablet    SYNTHROID/LEVOTHROID    90 tablet    TAKE 1 TABLET(75 MCG) BY MOUTH DAILY    Hypothyroidism, unspecified type

## 2018-02-23 NOTE — PROGRESS NOTES
"[facility] Initial Evaluation  Subjective:  HPI                    Objective:  System    Physical Exam    General     [facility] Initial Evaluation -- Lumbar    Date: February 23, 2018  Veronika Lucia is a 57 year old female with a lumbar spine condition.   Referral: RFV  Work mechanical stresses:FT    Employment status:  Prolonged sitting  Leisure mechanical stresses: \"Walking\" when the weather is nice  Functional disability score (REED/STarT Back):  *LOW  VAS score (0-10): 5  Patient goals/expectations:  \"I want this pain to go away\"    HISTORY:    Present symptoms: L LBP into thigh  Pain quality (sharp/shooting/stabbing/aching/burning/cramping):  Sore   Paresthesia (yes/no):  Yes    Present since (onset date): Fell  Back a month ago onto ice without incident, then lifted suitcase 2/10/18  Symptoms (improving/unchanging/worsening):  Improving since Prednisone (now nothing below knee).     Symptoms commenced as a result of: lifting a suitcare   Condition occurred in the following environment:   Home     Symptoms at onset (back/thigh/leg): L LBP and thigh into lower leg  Constant symptoms (back/thigh/leg): L ant thigh numbness  Intermittent symptoms (back/thigh/leg): L LBP and thigh into lower leg    Symptoms are made worse with the following: Always Standing, Always Walking, Time of day - No effect and Other - Lifting   Symptoms are made better with the following: Other - Steriods    Disturbed sleep (yes/no):  No Sleeping postures (prone/sup/side R/L):     Previous episodes (0/1-5/6-10/11+): None Year of first episode:     Previous history: None  Previous treatments: Went to ER for this, received MedRol Alexis, which was helpful      Specific Questions:  Cough/Sneeze/Strain (pos/neg): neg  Bowel/Bladder (normal/abnormal): normal  Gait (normal/abnormal): normal  Medications (nil/NSAIDS/analg/steroids/anticoag/other):  Steroids  Medical allergies:  " Hydrocodone  General health (excellent/good/fair/poor):  good  Pertinent medical history:  Allergies of unknown origen, episodic thumb pain, breast CA with mascectomy, thyroid  Imaging (None/Xray/MRI/Other):  None  Recent or major surgery (yes/no):  No  Night pain (yes/no): No  Accidents (yes/no): Yes, fall on ice 1 month prior without incident  Unexplained weight loss (yes/no): No  Barriers at home: None known  Other red flags: None known    EXAMINATION    Posture:   Sitting (good/fair/poor): Poor  Standing (good/fair/poor):Poor  Lordosis (red/acc/normal): Normal  Correction of posture (better/worse/no effect): NE    Lateral Shift (right/left/nil): right  Relevant (yes/no):  yes  Other Observations:     Neurological:    Motor deficit:  L SLSquat 4/5  Reflexes:  NT  Sensory deficit:  Constant, unremitting 50% hypoesthesia on the L Dural signs:  NT    Movement Loss:   Christian Mod Min Nil Pain   Flexion     P. L thigh (L numbness constant NE)   Extension     P. L LBP and thigh   Side Gliding R        Side Gliding L     P. L LBP and thigh     Test Movements:   During: produces, abolishes, increases, decreases, no effect, centralizing, peripheralizing   After: better, worse, no better, no worse, no effect, centralized, peripheralized    Pretest symptoms standing: L LBP and thigh   Symptoms During Symptoms After ROM increased ROM decreased No Effect   FIS Increases    No Effect         Rep FIS Increases    No Effect         EIS Increases  Peripheralising    No Worse         Rep EIS Increases  Peripheralising    No Worse         Pretest symptoms lyin/10 (constant L thigh numb)    Symptoms During Symptoms After ROM increased ROM decreased No Effect   JAYE        Rep JAYE with L rotation NE NE x  xnumbess   EIL        Rep EIL        If required, pretest symptoms: 0/10   Symptoms During Symptoms After ROM increased ROM decreased No Effect   SGIS - R        Rep SGIS - R        SGIS - L Increases  Peripheralising    No Worse          Rep SGIS - L Increases  Peripheralising    No Worse      x       Provisional Classification:  Inconclusive/Other - Mechanically Inconclusive and Trauma/Recovering Trauma: (possible HNP)    Principle of Management:  Education:     Equipment provided:    Mechanical therapy (Y/N):  Y   Extension principle:    Lateral Principle:  repFLexionrotation to the L 10' x 10 every 2 hours  Flexion principle:    Other:      ASSESSMENT/PLAN:        ASSESSMENT/PLAN:     Patient is a 60 year old female with lumbar complaints.    Patient has the following significant findings with corresponding treatment plan.                Diagnosis 1:  Neck and L shoulder pain  Pain -  self management, education, directional preference exercise and home program  Decreased ROM/flexibility - manual therapy, therapeutic exercise, therapeutic activity and home program  Decreased function - therapeutic activities and home program     Therapy Evaluation Codes:   1. History comprised of:                           Personal factors that impact the plan of care:                                                      None.                            Comorbidity factors that impact the plan of care are:                                                     Cancer                           Medications impacting care: None.  2. Examination of Body Systems comprised of:                           Body structures and functions that impact the plan of care:                                                      Lumbar spine                           Activity limitations that impact the plan of care are:                                                      Driving, Lifting, Sitting and Laying down.  3. Clinical presentation characteristics are:                           Stable/Uncomplicated.  4. Decision-Making                                                    Low complexity using standardized patient assessment instrument and/or measureable assessment of functional  outcome.  Cumulative Therapy Evaluation is: Low complexity.     Previous and current functional limitations:  (See Goal Flow Sheet for this information)    Short term and Long term goals: (See Goal Flow Sheet for this information)      Communication ability:  Patient appears to be able to clearly communicate and understand verbal and written communication and follow directions correctly.  Treatment Explanation - The following has been discussed with the patient:   RX ordered/plan of care  Anticipated outcomes  Possible risks and side effects  This patient would benefit from PT intervention to resume normal activities.   Rehab potential is excellent.     Frequency:  1 X week, once daily  Duration:  for 4 weeks  Discharge Plan:  Independent in home treatment program.  Reach maximal therapeutic benefit.

## 2018-02-28 ENCOUNTER — THERAPY VISIT (OUTPATIENT)
Dept: PHYSICAL THERAPY | Facility: CLINIC | Age: 58
End: 2018-02-28
Payer: COMMERCIAL

## 2018-02-28 DIAGNOSIS — M54.42 ACUTE LEFT-SIDED LOW BACK PAIN WITH LEFT-SIDED SCIATICA: Primary | ICD-10-CM

## 2018-02-28 PROCEDURE — 97110 THERAPEUTIC EXERCISES: CPT | Mod: GP | Performed by: PHYSICAL THERAPIST

## 2018-02-28 PROCEDURE — 97530 THERAPEUTIC ACTIVITIES: CPT | Mod: GP | Performed by: PHYSICAL THERAPIST

## 2018-02-28 PROCEDURE — 97140 MANUAL THERAPY 1/> REGIONS: CPT | Mod: GP | Performed by: PHYSICAL THERAPIST

## 2018-03-19 ENCOUNTER — TELEPHONE (OUTPATIENT)
Dept: FAMILY MEDICINE | Facility: CLINIC | Age: 58
End: 2018-03-19

## 2018-03-19 DIAGNOSIS — N64.4 BREAST PAIN, LEFT: Primary | ICD-10-CM

## 2018-03-19 NOTE — TELEPHONE ENCOUNTER
[facility] calling to ask for an order for a diagnostic mammogram on the left breast.  She is coming in April 2nd for this. She reports left breast pain for about one week.   History of breast cancer on the right, with mastectomy. Ordered today.    Sharifa Pereyra, RN  Triage Nurse

## 2018-03-20 ENCOUNTER — HOSPITAL ENCOUNTER (OUTPATIENT)
Dept: MAMMOGRAPHY | Facility: CLINIC | Age: 58
Discharge: HOME | End: 2018-03-20
Attending: INTERNAL MEDICINE | Admitting: INTERNAL MEDICINE
Payer: COMMERCIAL

## 2018-03-20 DIAGNOSIS — N64.4 BREAST PAIN, LEFT: ICD-10-CM

## 2018-03-20 PROCEDURE — G0279 TOMOSYNTHESIS, MAMMO: HCPCS

## 2018-04-02 ENCOUNTER — OFFICE VISIT (OUTPATIENT)
Dept: FAMILY MEDICINE | Facility: CLINIC | Age: 58
End: 2018-04-02
Payer: COMMERCIAL

## 2018-04-02 VITALS
DIASTOLIC BLOOD PRESSURE: 78 MMHG | BODY MASS INDEX: 28.32 KG/M2 | HEIGHT: 58 IN | HEART RATE: 65 BPM | TEMPERATURE: 97.8 F | WEIGHT: 134.9 LBS | OXYGEN SATURATION: 100 % | RESPIRATION RATE: 16 BRPM | SYSTOLIC BLOOD PRESSURE: 118 MMHG

## 2018-04-02 DIAGNOSIS — Z00.00 ROUTINE HISTORY AND PHYSICAL EXAMINATION OF ADULT: Primary | ICD-10-CM

## 2018-04-02 DIAGNOSIS — M81.0 OSTEOPOROSIS, UNSPECIFIED OSTEOPOROSIS TYPE, UNSPECIFIED PATHOLOGICAL FRACTURE PRESENCE: ICD-10-CM

## 2018-04-02 DIAGNOSIS — K22.70 BARRETT'S ESOPHAGUS WITHOUT DYSPLASIA: ICD-10-CM

## 2018-04-02 DIAGNOSIS — C50.911 MALIGNANT NEOPLASM OF RIGHT FEMALE BREAST, UNSPECIFIED ESTROGEN RECEPTOR STATUS, UNSPECIFIED SITE OF BREAST (H): ICD-10-CM

## 2018-04-02 DIAGNOSIS — Z13.6 CARDIOVASCULAR SCREENING; LDL GOAL LESS THAN 160: ICD-10-CM

## 2018-04-02 DIAGNOSIS — E03.9 HYPOTHYROIDISM, UNSPECIFIED TYPE: ICD-10-CM

## 2018-04-02 DIAGNOSIS — Z11.59 ENCOUNTER FOR HEPATITIS C SCREENING TEST FOR LOW RISK PATIENT: ICD-10-CM

## 2018-04-02 PROCEDURE — 80053 COMPREHEN METABOLIC PANEL: CPT | Performed by: INTERNAL MEDICINE

## 2018-04-02 PROCEDURE — 99396 PREV VISIT EST AGE 40-64: CPT | Performed by: INTERNAL MEDICINE

## 2018-04-02 PROCEDURE — 99213 OFFICE O/P EST LOW 20 MIN: CPT | Mod: 25 | Performed by: INTERNAL MEDICINE

## 2018-04-02 PROCEDURE — 36415 COLL VENOUS BLD VENIPUNCTURE: CPT | Performed by: INTERNAL MEDICINE

## 2018-04-02 PROCEDURE — 86803 HEPATITIS C AB TEST: CPT | Performed by: INTERNAL MEDICINE

## 2018-04-02 PROCEDURE — 84443 ASSAY THYROID STIM HORMONE: CPT | Performed by: INTERNAL MEDICINE

## 2018-04-02 PROCEDURE — 80061 LIPID PANEL: CPT | Performed by: INTERNAL MEDICINE

## 2018-04-02 PROCEDURE — 82306 VITAMIN D 25 HYDROXY: CPT | Performed by: INTERNAL MEDICINE

## 2018-04-02 PROCEDURE — 84439 ASSAY OF FREE THYROXINE: CPT | Performed by: INTERNAL MEDICINE

## 2018-04-02 RX ORDER — LEVOTHYROXINE SODIUM 75 UG/1
TABLET ORAL
Qty: 90 TABLET | Refills: 3 | Status: SHIPPED | OUTPATIENT
Start: 2018-04-02 | End: 2019-03-22

## 2018-04-02 ASSESSMENT — ENCOUNTER SYMPTOMS
CONSTIPATION: 0
EYE PAIN: 0
COUGH: 0
DIARRHEA: 0
HEMATURIA: 0
CHILLS: 0
ABDOMINAL PAIN: 0
DIZZINESS: 0
HEMATOCHEZIA: 0

## 2018-04-02 NOTE — MR AVS SNAPSHOT
After Visit Summary   4/2/2018    Veronika Lucia    MRN: [id number]           Patient Information     Date Of Birth          1960        Visit Information        Provider Department      4/2/2018 8:30 AM Mechelle Montgomery MD [facility]        Today's Diagnoses     Routine history and physical examination of adult    -  1    Hypothyroidism, unspecified type        Curran's esophagus without dysplasia        Malignant neoplasm of right female breast, unspecified estrogen receptor status, unspecified site of breast (H)        Encounter for hepatitis C screening test for low risk patient        CARDIOVASCULAR SCREENING; LDL GOAL LESS THAN 160        Osteoporosis, unspecified osteoporosis type, unspecified pathological fracture presence          Care Instructions      Preventive Health Recommendations  Female Ages 50 - 64    Yearly exam: See your health care provider every year in order to  o Review health changes.   o Discuss preventive care.    o Review your medicines if your doctor has prescribed any.      Get a Pap test every three years (unless you have an abnormal result and your provider advises testing more often).    If you get Pap tests with HPV test, you only need to test every 5 years, unless you have an abnormal result.     You do not need a Pap test if your uterus was removed (hysterectomy) and you have not had cancer.    You should be tested each year for STDs (sexually transmitted diseases) if you're at risk.     Have a mammogram every 1 to 2 years.    Have a colonoscopy at age 50, or have a yearly FIT test (stool test). These exams screen for colon cancer.      Have a cholesterol test every 5 years, or more often if advised.    Have a diabetes test (fasting glucose) every three years. If you are at risk for diabetes, you should have this test more often.     If you are at risk for osteoporosis (brittle bone disease), think about having a bone density scan  (DEXA).    Shots: Get a flu shot each year. Get a tetanus shot every 10 years.    Nutrition:     Eat at least 5 servings of fruits and vegetables each day.    Eat whole-grain bread, whole-wheat pasta and brown rice instead of white grains and rice.    Talk to your provider about Calcium and Vitamin D.     Lifestyle    Exercise at least 150 minutes a week (30 minutes a day, 5 days a week). This will help you control your weight and prevent disease.    Limit alcohol to one drink per day.    No smoking.     Wear sunscreen to prevent skin cancer.     See your dentist every six months for an exam and cleaning.    See your eye doctor every 1 to 2 years.            Follow-ups after your visit        Future tests that were ordered for you today     Open Future Orders        Priority Expected Expires Ordered    DX Hip/Pelvis/Spine Routine  4/2/2019 4/2/2018            Who to contact     If you have questions or need follow up information about today's clinic visit or your schedule please contact [facility] directly at [phone].  Normal or non-critical lab and imaging results will be communicated to you by Glownethart, letter or phone within 4 business days after the clinic has received the results. If you do not hear from us within 7 days, please contact the clinic through Bearcht or phone. If you have a critical or abnormal lab result, we will notify you by phone as soon as possible.  Submit refill requests through NanoPack or call your pharmacy and they will forward the refill request to us. Please allow 3 business days for your refill to be completed.          Additional Information About Your Visit        NanoPack Information     NanoPack gives you secure access to your electronic health record. If you see a primary care provider, you can also send messages to your care team and make appointments. If you have questions, please call your primary care clinic.  If you do not have a primary care provider, please  "call [phone] and they will assist you.        Care EveryWhere ID     This is your Care EveryWhere ID. This could be used by other organizations to access your [place] medical records  [id number]        Your Vitals Were     Pulse Temperature Respirations Height Pulse Oximetry BMI (Body Mass Index)    65 97.8  F (36.6  C) (Oral) 16 4' 9.75\" (1.467 m) 100% 28.44 kg/m2       Blood Pressure from Last 3 Encounters:   04/02/18 118/78   02/21/18 118/78   09/13/17 112/60    Weight from Last 3 Encounters:   04/02/18 134 lb 14.4 oz (61.2 kg)   02/21/18 135 lb 4.8 oz (61.4 kg)   09/13/17 131 lb 12.8 oz (59.8 kg)              We Performed the Following     Comprehensive metabolic panel     Hepatitis C antibody     Lipid panel reflex to direct LDL Fasting     T4 free     TSH          Today's Medication Changes          These changes are accurate as of 4/2/18  9:14 AM.  If you have any questions, ask your nurse or doctor.               These medicines have changed or have updated prescriptions.        Dose/Directions    levothyroxine 75 MCG tablet   Commonly known as:  SYNTHROID/LEVOTHROID   This may have changed:  See the new instructions.   Used for:  Hypothyroidism, unspecified type   Changed by:  Mechelle Montgomery MD        TAKE 1 TABLET(75 MCG) BY MOUTH DAILY   Quantity:  90 tablet   Refills:  3            Where to get your medicines      These medications were sent to [facility] [address] [facility] [address] [address] [facility] [place] [address] & [facility]  [address] [facility] [address]     Phone:  [phone]     levothyroxine 75 MCG tablet                Primary Care Provider Office Phone # Fax #    Mechelle Montgomery [phone] [address] [address] [address]        Equal Access to Services     ALEC TOBAR AH: Hadii chrissy ku hadasho Soomaali, waaxda luqadaha, qaybta kaalmada adeegyada, trevor danielle ah. So wa " [phone].    ATENCIÓN: Si fern louis, tiene a woodward disposición servicios gratuitos de asistencia lingüística. Benita shabazz [phone].    We comply with applicable federal civil rights laws and [place] laws. We do not discriminate on the basis of race, color, national origin, age, disability, sex, sexual orientation, or gender identity.            Thank you!     Thank you for choosing [facility]  for your care. Our goal is always to provide you with excellent care. Hearing back from our patients is one way we can continue to improve our services. Please take a few minutes to complete the written survey that you may receive in the mail after your visit with us. Thank you!             Your Updated Medication List - Protect others around you: Learn how to safely use, store and throw away your medicines at www.disposemymeds.org.          This list is accurate as of 4/2/18  9:14 AM.  Always use your most recent med list.                   Brand Name Dispense Instructions for use Diagnosis    LANsoprazole 15 MG CR capsule    PREVACID     Take 30 mg by mouth        levothyroxine 75 MCG tablet    SYNTHROID/LEVOTHROID    90 tablet    TAKE 1 TABLET(75 MCG) BY MOUTH DAILY    Hypothyroidism, unspecified type

## 2018-04-02 NOTE — PROGRESS NOTES
SUBJECTIVE:   CC: Veronika Lucia is an 57 year old woman who presents for preventive health visit.       Physical   Annual:     Getting at least 3 servings of Calcium per day::  Yes    Bi-annual eye exam::  Yes    Dental care twice a year::  Yes    Sleep apnea or symptoms of sleep apnea::  None    Diet::  Regular (no restrictions)    Frequency of exercise::  2-3 days/week    Duration of exercise::  15-30 minutes    Taking medications regularly::  Yes    Medication side effects::  Not applicable    Additional concerns today::  No          Hypothyroidism Follow-up    Since last visit, patient describes the following symptoms: Weight stable, no hair loss, no skin changes, no constipation, no loose stools    Patient is currently taking Levothyroxine 75 mcg QD  TSH   Date Value Ref Range Status   03/28/2017 1.84 0.40 - 4.00 mU/L Final     T4 Free   Date Value Ref Range Status   03/28/2017 1.14 0.76 - 1.46 ng/dL Final         Today's PHQ-2 Score:   PHQ-2 ( 1999 [employer]) 4/2/2018   Q1: Little interest or pleasure in doing things 0   Q2: Feeling down, depressed or hopeless 0   PHQ-2 Score 0   Q1: Little interest or pleasure in doing things Not at all   Q2: Feeling down, depressed or hopeless Not at all   PHQ-2 Score 0       Abuse: Current or Past(Physical, Sexual or Emotional)- No  Do you feel safe in your environment - Yes    Social History   Substance Use Topics     Smoking status: Never Smoker     Smokeless tobacco: Never Used     Alcohol use No     Alcohol Use 4/2/2018   If you drink alcohol do you typically have greater than 3 drinks per day OR greater than 7 drinks per week? Not Applicable       Reviewed orders with patient.  Reviewed health maintenance and updated orders accordingly - Yes  Labs reviewed in [facility]    Patient over age 50, mutual decision to screen reflected in health maintenance.    Pertinent mammograms are reviewed under the imaging tab.  History of abnormal Pap smear: Status post benign hysterectomy.  "Health Maintenance and Surgical History updated.    Reviewed and updated as needed this visit by clinical staff  Tobacco  Allergies  Meds  Med Hx  Surg Hx  Fam Hx  Soc Hx        Reviewed and updated as needed this visit by Provider        Past Medical History:   Diagnosis Date     Breast cancer (H) 2007    right mastectomy, Tamoxifen for 5 years; ; Dr. Denton     DVT (deep venous thrombosis) (H) 2009    left distal leg; following fracture/immobility; was also on Tamoxifen.     Thyroid disease 2000    Replacement therapy      Past Surgical History:   Procedure Laterality Date     HYSTERECTOMY TOTAL ABDOMINAL, BILATERAL SALPINGO-OOPHORECTOMY, COMBINED  2010    Dr. Turner fibroids; [facility]     obgyn  2007    right sided mastectomy        Review of Systems   Constitutional: Negative for chills.   HENT: Negative for congestion and ear pain.    Eyes: Negative for pain.   Respiratory: Negative for cough.    Cardiovascular: Negative for chest pain.   Gastrointestinal: Negative for abdominal pain, constipation, diarrhea and hematochezia.   Endocrine:        Hypothyroidism - use of levothyroxine   Genitourinary: Negative for hematuria.   Musculoskeletal:        POSITIVE for left wrist pain   Neurological: Negative for dizziness.   Breast: POSITIVE for left-sided breast pain; Hx of right-sided breast cancer (2007)    This document serves as a record of the services and decisions personally performed and made by Mechelle Montgomery MD. It was created on her behalf by Honey Juan, a trained medical scribe. The creation of this document is based on the provider's statements to the medical scribe.   Honey Juan, 9:02 AM, April 2, 2018     OBJECTIVE:   /78  Pulse 65  Temp 97.8  F (36.6  C) (Oral)  Resp 16  Ht 4' 9.75\" (1.467 m)  Wt 134 lb 14.4 oz (61.2 kg)  SpO2 100%  BMI 28.44 kg/m2     Physical Exam  GENERAL: healthy, alert and no distress  EYES: Eyes grossly normal to inspection, PERRL and conjunctivae and " sclerae normal  HENT: ear canals and TM's normal, nose and mouth without ulcers or lesions  NECK: no adenopathy, no asymmetry, masses, or scars and thyroid normal to palpation, no carotid bruits   RESP: lungs clear to auscultation - no rales, rhonchi or wheezes  BREAST: normal without masses, tenderness or nipple discharge and no palpable axillary masses or adenopathy  CV: regular rate and rhythm, normal S1 S2,  no peripheral edema and peripheral pulses strong  ABDOMEN: soft, nontender, no hepatosplenomegaly, no masses and bowel sounds normal  MS: Tenderness upon palpation to left pectoralis muscle; no gross musculoskeletal defects noted, no edema  SKIN: no suspicious lesions or rashes  NEURO: Normal strength and tone, mentation intact and speech normal  PSYCH: mentation appears normal, affect normal/bright    ASSESSMENT/PLAN:   (Z00.00) Routine history and physical examination of adult  (primary encounter diagnosis)  Comment: HEALTH CARE MAINTENANCE and immunizations reviewed and up to date; Clinical breast exam today; Mammogram Q2 years, due 3/20/2020; Colonoscopy 5 years, due 3/1/2022  Plan: Annual preventive visit     (E03.9) Hypothyroidism, unspecified type  Comment: Levothyroxine effective and well-tolerated; no change in medications/dosage at this time; will continue to monitor   Plan: levothyroxine (SYNTHROID/LEVOTHROID) 75 MCG         tablet, TSH, T4 free  TSH   Date Value Ref Range Status   03/28/2017 1.84 0.40 - 4.00 mU/L Final     T4 Free   Date Value Ref Range Status   03/28/2017 1.14 0.76 - 1.46 ng/dL Final       (K22.70) Curran's esophagus without dysplasia  Comment: EGD 2015 and [facility] [place] [facility] reassess in 3 years; 2021; procedure 3/9/2018  Plan: Vitamin D Deficiency    (C50.911) Malignant neoplasm of right female breast, unspecified estrogen receptor status, unspecified site of breast (H)  Comment: 2/2007  DCIS, Stage 1; right mastecotmy; Tamoxifen 5 years;  " [facility]- Dr. Denton  ER/SC + Her2 -, FISH -  Plan: will continue to monitor     (Z11.59) Encounter for hepatitis C screening test for low risk patient  Comment: Hepatitis C screening recommended for adults born between 1945 and 1965  Plan: Hepatitis C antibody    (Z13.6) CARDIOVASCULAR SCREENING; LDL GOAL LESS THAN 160  Comment: LDL goals reviewed;  fasting for labs today; will continue to monitor   Plan: Comprehensive metabolic panel, Lipid panel         reflex to direct LDL Fasting  LDL Cholesterol Calculated   Date Value Ref Range Status   03/28/2017 131 (H) <100 mg/dL Final     Comment:     Above desirable:  100-129 mg/dl   Borderline High:  130-159 mg/dL   High:             160-189 mg/dL   Very high:       >189 mg/dl         (M81.0) Osteoporosis, unspecified osteoporosis type, unspecified pathological fracture presence  Comment: pt will set up appointment for bone density scan; recommended adding vitamin D supplement  Plan: DX Hip/Pelvis/Spine, Vitamin D Deficiency    COUNSELING:  Reviewed preventive health counseling, as reflected in patient instructions  Special attention given to:        Regular exercise       Healthy diet/nutrition      reports that she has never smoked. She has never used smokeless tobacco.    Estimated body mass index is 28.44 kg/(m^2) as calculated from the following:    Height as of this encounter: 4' 9.75\" (1.467 m).    Weight as of this encounter: 134 lb 14.4 oz (61.2 kg).   Weight management plan: Discussed and encouraged healthy diet and exercise guidelines today.     Counseling Resources:  ATP IV Guidelines  Pooled Cohorts Equation Calculator  Breast Cancer Risk Calculator  FRAX Risk Assessment  ICSI Preventive Guidelines  Dietary Guidelines for Americans, 2010  USDA's MyPlate  ASA Prophylaxis  Lung CA Screening    Mechelle Montgomery MD  Internal Medicine  [facility]  15 minutes in addition to HEALTH CARE MAINTENANCE are spent with patient, over 50% of that time " spent providing counselling, discussing and reviewing medical conditions/concerns, meds and potential side effects.     The information in this document, created by a medical scribe for me, accurately reflects the services I personally performed and the decisions made by me. I have reviewed and approved this document for accuracy.  Dr. Mechelle Montgomery, 9:05 AM, April 2, 2018    Answers for HPI/ROS submitted by the patient on 4/2/2018   PHQ-2 Score: 0

## 2018-04-03 LAB
ALBUMIN SERPL-MCNC: 4 G/DL (ref 3.4–5)
ALP SERPL-CCNC: 80 U/L (ref 40–150)
ALT SERPL W P-5'-P-CCNC: 22 U/L (ref 0–50)
ANION GAP SERPL CALCULATED.3IONS-SCNC: 10 MMOL/L (ref 3–14)
AST SERPL W P-5'-P-CCNC: 19 U/L (ref 0–45)
BILIRUB SERPL-MCNC: 0.4 MG/DL (ref 0.2–1.3)
BUN SERPL-MCNC: 9 MG/DL (ref 7–30)
CALCIUM SERPL-MCNC: 9.5 MG/DL (ref 8.5–10.1)
CHLORIDE SERPL-SCNC: 106 MMOL/L (ref 94–109)
CHOLEST SERPL-MCNC: 279 MG/DL
CO2 SERPL-SCNC: 24 MMOL/L (ref 20–32)
CREAT SERPL-MCNC: 0.82 MG/DL (ref 0.52–1.04)
DEPRECATED CALCIDIOL+CALCIFEROL SERPL-MC: 18 UG/L (ref 20–75)
GFR SERPL CREATININE-BSD FRML MDRD: 71 ML/MIN/1.7M2
GLUCOSE SERPL-MCNC: 101 MG/DL (ref 70–99)
HCV AB SERPL QL IA: NONREACTIVE
HDLC SERPL-MCNC: 41 MG/DL
LDLC SERPL CALC-MCNC: 192 MG/DL
NONHDLC SERPL-MCNC: 238 MG/DL
POTASSIUM SERPL-SCNC: 4.1 MMOL/L (ref 3.4–5.3)
PROT SERPL-MCNC: 7.8 G/DL (ref 6.8–8.8)
SODIUM SERPL-SCNC: 140 MMOL/L (ref 133–144)
T4 FREE SERPL-MCNC: 1.1 NG/DL (ref 0.76–1.46)
TRIGL SERPL-MCNC: 231 MG/DL
TSH SERPL DL<=0.005 MIU/L-ACNC: 3.44 MU/L (ref 0.4–4)

## 2018-04-04 ENCOUNTER — MEDICAL CORRESPONDENCE (OUTPATIENT)
Dept: HEALTH INFORMATION MANAGEMENT | Facility: CLINIC | Age: 58
End: 2018-04-04

## 2018-04-11 ENCOUNTER — TELEPHONE (OUTPATIENT)
Dept: FAMILY MEDICINE | Facility: CLINIC | Age: 58
End: 2018-04-11

## 2018-04-11 NOTE — TELEPHONE ENCOUNTER
Reason for call:  Form   Our goal is to have forms completed within 72 hours, however some forms may require a visit or additional information.     Who is the form from? Patient  Where did the form come from? Patient or family brought in     What clinic location was the form placed at? [place]  Where was the form placed? [employer]s Box  What number is listed as a contact on the form? [phone]    Phone call message - patient request for a letter, form or note:     Date needed: at your convenience  Please fax to [phone]  Has the patient signed a consent form for release of information? Not Applicable    Additional comments:     Type of letter, form or note: medical    Phone number to reach patient:  Home number on file [phone] (home)    Best Time:  any    Can we leave a detailed message on this number?  YES

## 2018-04-12 ENCOUNTER — MYC MEDICAL ADVICE (OUTPATIENT)
Dept: FAMILY MEDICINE | Facility: CLINIC | Age: 58
End: 2018-04-12

## 2018-04-19 ENCOUNTER — TELEPHONE (OUTPATIENT)
Dept: BONE DENSITY | Facility: CLINIC | Age: 58
End: 2018-04-19

## 2018-05-16 ENCOUNTER — RADIANT APPOINTMENT (OUTPATIENT)
Dept: BONE DENSITY | Facility: CLINIC | Age: 58
End: 2018-05-16
Attending: INTERNAL MEDICINE
Payer: COMMERCIAL

## 2018-05-16 DIAGNOSIS — M81.0 OSTEOPOROSIS, UNSPECIFIED OSTEOPOROSIS TYPE, UNSPECIFIED PATHOLOGICAL FRACTURE PRESENCE: ICD-10-CM

## 2018-05-16 PROCEDURE — 77080 DXA BONE DENSITY AXIAL: CPT | Performed by: INTERNAL MEDICINE

## 2018-05-23 NOTE — TELEPHONE ENCOUNTER
Lab Results   Component Value Date    TSH 3.44 04/02/2018    TSH 1.84 03/28/2017     Lab Results   Component Value Date    T4 1.10 04/02/2018    T4 1.14 03/28/2017     Would continue angelica medication and dosage.

## 2018-05-23 NOTE — PROGRESS NOTES
Osteoporosis noted  Follow up appt recommended to determine best treatment option  Pt advised via My Chart.

## 2018-05-23 NOTE — TELEPHONE ENCOUNTER
Patient called about forms and said that they were marked as incomplete. She would like to have forms refaxed. Let patient know that I would look for the forms but she may need to bring a new copy in.

## 2018-05-24 NOTE — TELEPHONE ENCOUNTER
In response to [facility] message, advised as below:   -per Dr. Montgomery, stay on same thyroid med and dose  -per note from LOV, to lower cholesterol through diet and exercise, monitor and return in 6 months for follow-up.    Margie AGUERO, Triage RN

## 2018-05-29 ENCOUNTER — TELEPHONE (OUTPATIENT)
Dept: FAMILY MEDICINE | Facility: CLINIC | Age: 58
End: 2018-05-29

## 2018-05-29 NOTE — TELEPHONE ENCOUNTER
Pt calls.    She sent a message about her cholesterol levels.  See mychart of 4/12/18.  Pt wants to start on medication.      Advised to make an appt.  She has an appt next week.  She will discuss then.

## 2018-06-06 ENCOUNTER — OFFICE VISIT (OUTPATIENT)
Dept: FAMILY MEDICINE | Facility: CLINIC | Age: 58
End: 2018-06-06
Payer: COMMERCIAL

## 2018-06-06 VITALS
BODY MASS INDEX: 27.83 KG/M2 | SYSTOLIC BLOOD PRESSURE: 118 MMHG | TEMPERATURE: 97.4 F | WEIGHT: 132 LBS | RESPIRATION RATE: 16 BRPM | OXYGEN SATURATION: 99 % | DIASTOLIC BLOOD PRESSURE: 78 MMHG | HEART RATE: 74 BPM

## 2018-06-06 DIAGNOSIS — M81.0 AGE-RELATED OSTEOPOROSIS WITHOUT CURRENT PATHOLOGICAL FRACTURE: ICD-10-CM

## 2018-06-06 DIAGNOSIS — R73.09 ELEVATED GLUCOSE: Primary | ICD-10-CM

## 2018-06-06 DIAGNOSIS — E78.5 HYPERLIPIDEMIA LDL GOAL <130: ICD-10-CM

## 2018-06-06 LAB — HBA1C MFR BLD: 5.7 % (ref 0–5.6)

## 2018-06-06 PROCEDURE — 99214 OFFICE O/P EST MOD 30 MIN: CPT | Performed by: INTERNAL MEDICINE

## 2018-06-06 PROCEDURE — 36415 COLL VENOUS BLD VENIPUNCTURE: CPT | Performed by: INTERNAL MEDICINE

## 2018-06-06 PROCEDURE — 83036 HEMOGLOBIN GLYCOSYLATED A1C: CPT | Performed by: INTERNAL MEDICINE

## 2018-06-06 RX ORDER — ALENDRONATE SODIUM 70 MG/1
TABLET ORAL
Qty: 12 TABLET | Refills: 3 | Status: SHIPPED | OUTPATIENT
Start: 2018-06-06 | End: 2019-04-03

## 2018-06-06 NOTE — MR AVS SNAPSHOT
After Visit Summary   6/6/2018    Veronika Lucia    MRN: [id number]           Patient Information     Date Of Birth          1960        Visit Information        Provider Department      6/6/2018 3:30 PM Mechelle Montgomery MD [facility]        Today's Diagnoses     Elevated glucose    -  1    Hyperlipidemia LDL goal <130        Age-related osteoporosis without current pathological fracture          Care Instructions    Recent Labs   Lab Test  04/02/18   0921  03/28/17   0818   02/12/15   0849  02/14/14   0807   CHOL  279*  216*   < >  252*  230*   HDL  41*  40*   < >  56  46*   LDL  192*  131*   < >  150*  151*   TRIG  231*  227*   < >  228*  165*   CHOLHDLRATIO   --    --    --   4.5  5.0    < > = values in this interval not displayed.                   Follow-ups after your visit        Future tests that were ordered for you today     Open Future Orders        Priority Expected Expires Ordered    Lipid panel reflex to direct LDL Fasting Routine 9/1/2018 12/31/2018 6/6/2018            Who to contact     If you have questions or need follow up information about today's clinic visit or your schedule please contact [facility] directly at [phone].  Normal or non-critical lab and imaging results will be communicated to you by MyChart, letter or phone within 4 business days after the clinic has received the results. If you do not hear from us within 7 days, please contact the clinic through "[employer]"hart or phone. If you have a critical or abnormal lab result, we will notify you by phone as soon as possible.  Submit refill requests through [employer] or call your pharmacy and they will forward the refill request to us. Please allow 3 business days for your refill to be completed.          Additional Information About Your Visit        MyChart Information     [employer] gives you secure access to your electronic health record. If you see a primary care provider, you can also  send messages to your care team and make appointments. If you have questions, please call your primary care clinic.  If you do not have a primary care provider, please call [phone] and they will assist you.        Care EveryWhere ID     This is your Care EveryWhere ID. This could be used by other organizations to access your [place] medical records  [id number]        Your Vitals Were     Pulse Temperature Respirations Pulse Oximetry BMI (Body Mass Index)       74 97.4  F (36.3  C) (Oral) 16 99% 27.83 kg/m2        Blood Pressure from Last 3 Encounters:   06/06/18 118/78   04/02/18 118/78   02/21/18 118/78    Weight from Last 3 Encounters:   06/06/18 132 lb (59.9 kg)   04/02/18 134 lb 14.4 oz (61.2 kg)   02/21/18 135 lb 4.8 oz (61.4 kg)              We Performed the Following     Hemoglobin A1c          Today's Medication Changes          These changes are accurate as of 6/6/18  4:59 PM.  If you have any questions, ask your nurse or doctor.               Start taking these medicines.        Dose/Directions    alendronate 70 MG tablet   Commonly known as:  FOSAMAX   Used for:  Age-related osteoporosis without current pathological fracture   Started by:  Mechelle Montgomery MD        Take 1 tablet (70 mg) by mouth with 8oz water every 7 days 30-45 minutes before breakfast and remain upright during this time.   Quantity:  12 tablet   Refills:  3            Where to get your medicines      These medications were sent to [facility] [address] [facility] [address] [facility] [address] [place] [address] & [facility]  [address] [facility] [address] [facility] [address]     Phone:  [phone]     alendronate 70 MG tablet                Primary Care Provider Office Phone # Fax #    Mechelle Montgomery [phone] [address]  [facility] access hospital [address]        Equal Access to Services     ALEC TOBAR AH: Cesia Jose, alisa pratt, lucila bartholomew, trevor santos  katherin michelletrejose whaleyCooperaahussain ah. So [facility] [phone].    ATENCIÓN: Si naseemla heriberto, tiene a woodward disposición servicios gratuitos de asistencia lingüística. Benita al [phone].    We comply with applicable federal civil rights laws and [place] laws. We do not discriminate on the basis of race, color, national origin, age, disability, sex, sexual orientation, or gender identity.            Thank you!     Thank you for choosing [facility]  for your care. Our goal is always to provide you with excellent care. Hearing back from our patients is one way we can continue to improve our services. Please take a few minutes to complete the written survey that you may receive in the mail after your visit with us. Thank you!             Your Updated Medication List - Protect others around you: Learn how to safely use, store and throw away your medicines at www.disposemymeds.org.          This list is accurate as of 6/6/18  4:59 PM.  Always use your most recent med list.                   Brand Name Dispense Instructions for use Diagnosis    alendronate 70 MG tablet    FOSAMAX    12 tablet    Take 1 tablet (70 mg) by mouth with 8oz water every 7 days 30-45 minutes before breakfast and remain upright during this time.    Age-related osteoporosis without current pathological fracture       LANsoprazole 15 MG CR capsule    PREVACID     Take 30 mg by mouth        levothyroxine 75 MCG tablet    SYNTHROID/LEVOTHROID    90 tablet    TAKE 1 TABLET(75 MCG) BY MOUTH DAILY    Hypothyroidism, unspecified type

## 2018-06-06 NOTE — PROGRESS NOTES
SUBJECTIVE:   Veronika Lucia is a 57 year old female who presents to clinic today for the following health issues:      Hyperlipidemia Follow-Up on labs done in April   would like to try cholesterol medication       Rate your low fat/cholesterol diet?: good    Taking statin?  No  Recent Labs   Lab Test  04/02/18   0921  03/28/17   0818   02/12/15   0849  02/14/14   0807   CHOL  279*  216*   < >  252*  230*   HDL  41*  40*   < >  56  46*   LDL  192*  131*   < >  150*  151*   TRIG  231*  227*   < >  228*  165*   CHOLHDLRATIO   --    --    --   4.5  5.0    < > = values in this interval not displayed.     Vital Signs 12/16/2015 3/15/2016 3/28/2017 9/13/2017   Weight (LB) 128 lb 130 lb 4.8 oz 127 lb 11.2 oz 131 lb 12.8 oz     Vital Signs 2/21/2018 4/2/2018 6/6/2018   Weight (LB) 135 lb 4.8 oz 134 lb 14.4 oz 132 lb       The 10-year ASCVD risk score (Reva TRUDI Jr, et al., 2013) is: 4.1%    Values used to calculate the score:      Age: 57 years      Sex: Female      Is Non- : No      Diabetic: No      Tobacco smoker: No      Systolic Blood Pressure: 118 mmHg      Is BP treated: No      HDL Cholesterol: 41 mg/dL      Total Cholesterol: 279 mg/dL      Other lipid medications/supplements?:  none    Follow up on Dexa scan      Amount of exercise or physical activity: 6-7 days/week for an average of 10,000 steps daily for the past 2 months     Problems taking medications regularly: No    Medication side effects: none    Diet: regular (no restrictions)            Problem list and histories reviewed & adjusted, as indicated.  Additional history: as documented    Patient Active Problem List   Diagnosis     Hypothyroidism     Breast cancer (H)     CARDIOVASCULAR SCREENING; LDL GOAL LESS THAN 160     Vitamin D deficiency     Curran's esophagus     GERD (gastroesophageal reflux disease)     Osteoporosis     Acute left-sided low back pain with left-sided sciatica     Past Surgical History:   Procedure  Laterality Date     HYSTERECTOMY TOTAL ABDOMINAL, BILATERAL SALPINGO-OOPHORECTOMY, COMBINED  2010    Dr. Turner fibroids; [facility]     obgyn  2007    right sided mastectomy        Social History   Substance Use Topics     Smoking status: Never Smoker     Smokeless tobacco: Never Used     Alcohol use No     Family History   Problem Relation Age of Onset     Cancer Mother      oral cancer     Hyperlipidemia Brother      Hyperlipidemia Sister          Current Outpatient Prescriptions   Medication Sig Dispense Refill     alendronate (FOSAMAX) 70 MG tablet Take 1 tablet (70 mg) by mouth with 8oz water every 7 days 30-45 minutes before breakfast and remain upright during this time. 12 tablet 3     LANsoprazole (PREVACID) 15 MG CR capsule Take 30 mg by mouth       levothyroxine (SYNTHROID/LEVOTHROID) 75 MCG tablet TAKE 1 TABLET(75 MCG) BY MOUTH DAILY 90 tablet 3     Allergies   Allergen Reactions     Hydrocodone Hives and Itching     BP Readings from Last 3 Encounters:   06/06/18 118/78   04/02/18 118/78   02/21/18 118/78    Wt Readings from Last 3 Encounters:   06/06/18 132 lb (59.9 kg)   04/02/18 134 lb 14.4 oz (61.2 kg)   02/21/18 135 lb 4.8 oz (61.4 kg)                  Labs reviewed in EPIC    Reviewed and updated as needed this visit by clinical staff  Tobacco  Allergies  Meds  Problems  Med Hx  Surg Hx  Fam Hx  Soc Hx        Reviewed and updated as needed this visit by Provider  Allergies  Meds  Problems         ROS:  CONSTITUTIONAL:NEGATIVE for fever, chills, change in weight  INTEGUMENTARY/SKIN: NEGATIVE for worrisome rashes, moles or lesions  RESP:NEGATIVE for significant cough or SOB  CV: NEGATIVE for chest pain, palpitations or peripheral edema and cardiac risk assessment reviewed  GI: NEGATIVE for nausea, abdominal pain, heartburn, or change in bowel habits  MUSCULOSKELETAL: NEGATIVE for significant arthralgias or myalgia  NEURO: NEGATIVE for weakness, dizziness or paresthesias  ENDOCRINE: NEGATIVE for  temperature intolerance, skin/hair changes and reviewed Vit D, lipids, etc  HEME/ALLERGY/IMMUNE: NEGATIVE for bleeding problems  PSYCHIATRIC: NEGATIVE for changes in mood or affect    OBJECTIVE:     /78  Pulse 74  Temp 97.4  F (36.3  C) (Oral)  Resp 16  Wt 132 lb (59.9 kg)  SpO2 99%  BMI 27.83 kg/m2  Body mass index is 27.83 kg/(m^2).  GENERAL: healthy, alert and no distress  NECK: no adenopathy, no asymmetry, masses, or scars and thyroid normal to palpation  RESP: lungs clear to auscultation - no rales, rhonchi or wheezes  CV: regular rates and rhythm, normal S1 S2, peripheral pulses strong and no peripheral edema  ABDOMEN: soft, nontender, no hepatosplenomegaly, no masses and bowel sounds normal  MS: no gross musculoskeletal defects noted, no edema  SKIN: no suspicious lesions or rashes  PSYCH: mentation appears normal, affect normal/bright      ASSESSMENT/PLAN:       (R73.09) Elevated glucose  (primary encounter diagnosis)  Comment:   Lab Results   Component Value Date     04/02/2018    GLC 92 03/28/2017     Lab Results   Component Value Date    A1C 5.7 06/06/2018     Done at today's appt.    Minimally elevated; best to keep active, eat healthy; watch carbohydrate intake  Plan: Hemoglobin A1c        Monitor dietary intake of starches; add protein for added nutritional support    (E78.5) Hyperlipidemia LDL goal <130  Comment: well informed discussion about lipid lab results, risk assessment- as noted  4.1% risk ( 5% is borderline), 7.5% risk is recommend starting statin;  She acknowledges a family history of cholesterol but states desire to address healthy lifestyle, NOT start statin and reassess in 3 months; future lab orders have been placed.  In the future , if opts to initiate therapy in the future., she desires to use low dose statin therapy.   Plan: Lipid panel reflex to direct LDL Fasting          (M81.0) Age-related osteoporosis without current pathological fracture  Comment: reviewed  DEXAs, risk for fracture.  Agree with plans to continue healthy diet, regular exercise and continue for weekly bisphosphonate therapy; could consider infusion;   Plan: alendronate (FOSAMAX) 70 MG tablet        Healthy diet; weight bearing exercise; dietary vit D        Mechelle Montgomery MD  Internal Medicine   [facility]

## 2018-06-06 NOTE — PATIENT INSTRUCTIONS
Recent Labs   Lab Test  04/02/18   0921  03/28/17   0818   02/12/15   0849  02/14/14   0807   CHOL  279*  216*   < >  252*  230*   HDL  41*  40*   < >  56  46*   LDL  192*  131*   < >  150*  151*   TRIG  231*  227*   < >  228*  165*   CHOLHDLRATIO   --    --    --   4.5  5.0    < > = values in this interval not displayed.

## 2018-06-07 NOTE — TELEPHONE ENCOUNTER
"Requested Prescriptions   Pending Prescriptions Disp Refills     alendronate (FOSAMAX) 70 MG tablet [Pharmacy Med Name: ALENDRONATE 70MG TABLETS]  This medication may not be due for a refill.  Last Written Prescription Date:  6/6/2018  Last Fill Quantity: 12 tablet,  # refills: 3   Last office visit: 6/6/2018 with prescribing provider:  Mechelle Montgomery   Future Office Visit:     12 tablet 3     Sig: TAKE 1 TABLET BY MOUTH WITH 8 OUNCES OF WATER EVERY 7 DAYS 30-45 MINUTES BEFORE BREAKFAST AND REMAIN UPRIGHT DURING THIS TIME    Bisphosphonates Passed    6/6/2018  6:05 PM       Passed - Recent (12 mo) or future (30 days) visit within the authorizing provider's specialty    Patient had office visit in the last 12 months or has a visit in the next 30 days with authorizing provider or within the authorizing provider's specialty.  See \"Patient Info\" tab in inbasket, or \"Choose Columns\" in Meds & Orders section of the refill encounter.         Passed - Dexa on file within past 2 years    Please review last Dexa result. Done on 5/16/2018       Passed - Patient is age 18 or older       Passed - Normal serum creatinine on file within past 12 months    Recent Labs   Lab Test  04/02/18   0921   CR  0.82               "

## 2018-06-12 RX ORDER — ALENDRONATE SODIUM 70 MG/1
TABLET ORAL
Qty: 1 TABLET | Refills: 0 | OUTPATIENT
Start: 2018-06-12

## 2018-06-12 NOTE — TELEPHONE ENCOUNTER
This RX was filled on 06/06/18.  I called pharmacy to verify that they had received this RX.  HOLGER Fernández RN

## 2018-06-19 ENCOUNTER — MYC MEDICAL ADVICE (OUTPATIENT)
Dept: FAMILY MEDICINE | Facility: CLINIC | Age: 58
End: 2018-06-19

## 2018-06-19 DIAGNOSIS — Z13.6 CARDIOVASCULAR SCREENING; LDL GOAL LESS THAN 160: Primary | ICD-10-CM

## 2018-06-19 NOTE — TELEPHONE ENCOUNTER
Routing to PCP:    Patient sent [facility] message stating she is ready to add cholesterol medication to help reduce her cholesterol. Requesting a prescription be sent to her pharmacy.    Margie AGUERO, Triage RN

## 2018-06-20 ENCOUNTER — TELEPHONE (OUTPATIENT)
Dept: FAMILY MEDICINE | Facility: CLINIC | Age: 58
End: 2018-06-20

## 2018-06-20 RX ORDER — ATORVASTATIN CALCIUM 20 MG/1
20 TABLET, FILM COATED ORAL DAILY
Qty: 90 TABLET | Refills: 1 | Status: SHIPPED | OUTPATIENT
Start: 2018-06-20 | End: 2018-06-25 | Stop reason: DRUGHIGH

## 2018-06-20 NOTE — TELEPHONE ENCOUNTER
Clinic Action Needed: Yes, please call patient back at [phone]. Ok to leave detailed VM.   Reason for Call: Veronika is calling with questions in regards to her atorvastatin Rx.   She just received a call from pharmacy stating that her prescriptions is ready to be picked up. Patient is wondering what her dose is.     RN informed that the current ordered dose is 20 mg as documented in Epic.   Patient is requesting if the medication can be ordered for the lowest dose possible (mentioned 10 mg).   She stated that she is going to try other ways to help with her high cholesterol (i.e. exercise, eat healthy, and lose weight)  FNA advised that will send message to care team and recommend the patient call the clinic tomorrow to follow up.     Patient verbalized understanding of and agreement with plan and had no further questions.     Patient Recommendations: Advised to follow up with clinic.   Routed to: Dr. Montgomery/ MIKE Galicia RN  [facility]

## 2018-06-20 NOTE — TELEPHONE ENCOUNTER
Recent Labs   Lab Test  04/02/18   0921  03/28/17   0818   02/12/15   0849  02/14/14   0807   CHOL  279*  216*   < >  252*  230*   HDL  41*  40*   < >  56  46*   LDL  192*  131*   < >  150*  151*   TRIG  231*  227*   < >  228*  165*   CHOLHDLRATIO   --    --    --   4.5  5.0    < > = values in this interval not displayed.     The 10-year ASCVD risk score (Revacynthia BRUSH Jr, et al., 2013) is: 4.1%    Values used to calculate the score:      Age: 57 years      Sex: Female      Is Non- : No      Diabetic: No      Tobacco smoker: No      Systolic Blood Pressure: 118 mmHg      Is BP treated: No      HDL Cholesterol: 41 mg/dL      Total Cholesterol: 279 mg/dL     Based on discussion at appt, and desire to be on low dose of medication, recommend Atorvastatin 20 mg per day and to recheck in 3 months on that med/dose. Family hx of cholesterol issues.   Future labs on file and med sent to pharmacy.

## 2018-06-25 ENCOUNTER — VIRTUAL VISIT (OUTPATIENT)
Dept: FAMILY MEDICINE | Facility: CLINIC | Age: 58
End: 2018-06-25
Payer: COMMERCIAL

## 2018-06-25 DIAGNOSIS — E78.00 ELEVATED CHOLESTEROL: Primary | ICD-10-CM

## 2018-06-25 PROCEDURE — 99441 ZZC PHYSICIAN TELEPHONE EVALUATION 5-10 MIN: CPT | Performed by: INTERNAL MEDICINE

## 2018-06-25 RX ORDER — ATORVASTATIN CALCIUM 10 MG/1
10 TABLET, FILM COATED ORAL DAILY
Qty: 90 TABLET | Refills: 1 | Status: SHIPPED | OUTPATIENT
Start: 2018-06-25 | End: 2018-12-15

## 2018-06-25 NOTE — MR AVS SNAPSHOT
After Visit Summary   6/25/2018    Veronika Lucia    MRN: [id number]           Patient Information     Date Of Birth          1960        Visit Information        Provider Department      6/25/2018 4:00 PM Mechelle Montgomery MD [facility]        Today's Diagnoses     Elevated cholesterol    -  1       Follow-ups after your visit        Who to contact     If you have questions or need follow up information about today's clinic visit or your schedule please contact [facility] directly at [phone].  Normal or non-critical lab and imaging results will be communicated to you by "[employer]hart, letter or phone within 4 business days after the clinic has received the results. If you do not hear from us within 7 days, please contact the clinic through [employer] or phone. If you have a critical or abnormal lab result, we will notify you by phone as soon as possible.  Submit refill requests through [employer] [facility] [employer] or call your pharmacy and they will forward the refill request to us. Please allow 3 business days for your refill to be completed.          Additional Information About Your Visit        MyChart Information     [employer] gives you secure access to your electronic health record. If you see a primary care provider, you can also send messages to your care team and make appointments. If you have questions, please call your primary care clinic.  If you do not have a primary care provider, please call [phone] and they will assist you.        Care EveryWhere ID     This is your Care EveryWhere ID. This could be used by other organizations to access your [place] medical records  [id number]         Blood Pressure from Last 3 Encounters:   06/06/18 118/78   04/02/18 118/78   02/21/18 118/78    Weight from Last 3 Encounters:   06/06/18 132 lb (59.9 kg)   04/02/18 134 lb 14.4 oz (61.2 kg)   02/21/18 135 lb 4.8 oz (61.4 kg)              Today, you had the following     No orders  found for display         Today's Medication Changes          These changes are accurate as of 6/25/18  5:59 PM.  If you have any questions, ask your nurse or doctor.               These medicines have changed or have updated prescriptions.        Dose/Directions    atorvastatin 10 MG tablet   Commonly known as:  LIPITOR   This may have changed:    - medication strength  - how much to take  - additional instructions   Used for:  Elevated cholesterol   Changed by:  Mechelle Montgomery MD        Dose:  10 mg   Take 1 tablet (10 mg) by mouth daily This should replace Atorvastatin 20 mg dose.   Quantity:  90 tablet   Refills:  1            Where to get your medicines      These medications were sent to [facility] [address] [place], [place] - [address] [facility] [address] [facility]  [address], [place] [address]     Phone:  [phone]     atorvastatin 10 MG tablet                Primary Care Provider Office Phone # Fax #    Mechelle Montgomery [phone] [address]  [facility] access hospital [address]        Equal Access to Services     [facility] AH: Hadii aad ku hadasho Soomaali, waaxda luqadaha, qaybta kaalmada adeegyada, waxay idiin hayaan katherin khjairo danielle . So [facility] [phone].    ATENCIÓN: Si habla español, tiene a woodward disposición servicios gratuitos de asistencia lingüística. Llame al [phone].    We comply with applicable federal civil rights laws and [place] laws. We do not discriminate on the basis of race, color, national origin, age, disability, sex, sexual orientation, or gender identity.            Thank you!     Thank you for choosing [facility]  for your care. Our goal is always to provide you with excellent care. Hearing back from our patients is one way we can continue to improve our services. Please take a few minutes to complete the written survey that you may receive in the mail after your visit with us. Thank you!              Your Updated Medication List - Protect others around you: Learn how to safely use, store and throw away your medicines at www.disposemymeds.org.          This list is accurate as of 6/25/18  5:59 PM.  Always use your most recent med list.                   Brand Name Dispense Instructions for use Diagnosis    alendronate 70 MG tablet    FOSAMAX    12 tablet    Take 1 tablet (70 mg) by mouth with 8oz water every 7 days 30-45 minutes before breakfast and remain upright during this time.    Age-related osteoporosis without current pathological fracture       atorvastatin 10 MG tablet    LIPITOR    90 tablet    Take 1 tablet (10 mg) by mouth daily This should replace Atorvastatin 20 mg dose.    Elevated cholesterol       LANsoprazole 15 MG CR capsule    PREVACID     Take 30 mg by mouth        levothyroxine 75 MCG tablet    SYNTHROID/LEVOTHROID    90 tablet    TAKE 1 TABLET(75 MCG) BY MOUTH DAILY    Hypothyroidism, unspecified type

## 2018-06-25 NOTE — PROGRESS NOTES
"Veronika Lucia is a 57 year old female who is being evaluated via a telephone visit.      The patient has been notified of following:     \"This telephone visit will be conducted via a call between you and your physician/provider. We have found that certain health care needs can be provided without the need for a physical exam.  This service lets us provide the care you need with a short phone conversation.  If a prescription is necessary we can send it directly to your pharmacy.  If lab work is needed we can place an order for that and you can then stop by our lab to have the test done at a later time.    We will bill your insurance company for this service.  Please check with your medical insurance if this type of visit is covered. You may be responsible for the cost of this type of visit if insurance coverage is denied.  The typical cost is $30 (10min), $59 (11-20min) and $85 (21-30min).  Most often these visits are shorter than 10 minutes.    If during the course of the call the physician/provider feels a telephone visit is not appropriate, you will not be charged for this service.\"       Consent has been obtained for this service by care team member: yes.   See the scanned image in the medical record.    Veronika Lucia complains of  Lipids (desires low dose statin; )      I have reviewed and updated the patient's Past Medical History, Social History, Family History and Medication List.    ALLERGIES  Hydrocodone    Shayy Joshi CMA   (MA signature)    Additional provider notes:   At recent appt, we discussed importance of life style modifications, and she would do that , check labs and if not at goal, she would then plan to start statin. She discussed with family and then called to have it ordered- Atorvastatin 20 mg.   Family has had good luck with Atorvastatin 10 mg and well tolerated. She preferred the lower dose and preferred to NOT cut tablet in half.   Reviewed goals of treatment and follow up in 12 " weeks.     Recent Labs   Lab Test  04/02/18   0921  03/28/17   0818   02/12/15   0849  02/14/14   0807   CHOL  279*  216*   < >  252*  230*   HDL  41*  40*   < >  56  46*   LDL  192*  131*   < >  150*  151*   TRIG  231*  227*   < >  228*  165*   CHOLHDLRATIO   --    --    --   4.5  5.0    < > = values in this interval not displayed.     The 10-year ASCVD risk score (Beachwoodcynthia BRUSH Jr, et al., 2013) is: 4.1%    Values used to calculate the score:      Age: 57 years      Sex: Female      Is Non- : No      Diabetic: No      Tobacco smoker: No      Systolic Blood Pressure: 118 mmHg      Is BP treated: No      HDL Cholesterol: 41 mg/dL      Total Cholesterol: 279 mg/dL     Assessment/Plan:  (E78.00) Elevated cholesterol  (primary encounter diagnosis)  Comment: pt present with strong FH of high cholesterol; prefers low dose.  reassess fasting labs in 12 weeks. future labs ordered.  pt prefers 10 mg and would rather NOT cut dose in half.  Reviewed risk assessment.  Plan: atorvastatin (LIPITOR) 10 MG tablet        Future order have recently been placed. She will have labs done and could consider phone visit, E-Visit or follow up in clinic       I have reviewed the note as documented above.  This accurately captures the substance of my conversation with the patient,    Mechelle Montgomery MD  Internal Medicine  electronically signed    Total time of call between patient and provider was 7 minutes

## 2018-08-25 ENCOUNTER — MYC MEDICAL ADVICE (OUTPATIENT)
Dept: FAMILY MEDICINE | Facility: CLINIC | Age: 58
End: 2018-08-25

## 2018-08-27 NOTE — TELEPHONE ENCOUNTER
Almost 10 years since last, likely effectiveness has weaned. Should consider getting updated tdap and shingrix.

## 2018-08-27 NOTE — TELEPHONE ENCOUNTER
Last tdap was 9/21/10.  Will forward to Dr. Montgomery and FAVIOLA Emmanuel for advisal.      Please see pts mychart.  Would you advise getting the new shingles shot?  Update tdap?

## 2018-08-31 ENCOUNTER — ALLIED HEALTH/NURSE VISIT (OUTPATIENT)
Dept: NURSING | Facility: CLINIC | Age: 58
End: 2018-08-31
Payer: COMMERCIAL

## 2018-08-31 DIAGNOSIS — Z23 NEED FOR VACCINATION: Primary | ICD-10-CM

## 2018-08-31 PROCEDURE — 99207 ZZC NO CHARGE NURSE ONLY: CPT

## 2018-08-31 PROCEDURE — 90472 IMMUNIZATION ADMIN EACH ADD: CPT

## 2018-08-31 PROCEDURE — 90715 TDAP VACCINE 7 YRS/> IM: CPT

## 2018-08-31 PROCEDURE — 90471 IMMUNIZATION ADMIN: CPT

## 2018-08-31 PROCEDURE — 90750 HZV VACC RECOMBINANT IM: CPT

## 2018-08-31 NOTE — TELEPHONE ENCOUNTER
Most Recent Immunizations   Administered Date(s) Administered     FLU 6-35 months 11/06/2017     Hepatitis A Immunity: Titer/MD Dx 12/16/2015     Hepatitis B Immunity: Titer 12/16/2015     Influenza (H1N1) 02/11/2010     Influenza (IIV3) PF 09/21/2010     Influenza Vaccine IM 3yrs+ 4 Valent IIV4 10/14/2015     MMR Not Indicated - By Titer 12/16/2015     OPV, trivalent, live 09/21/2010     TD (ADULT, 7+) 04/07/2003     TDAP Vaccine (Adacel) 08/31/2018     Tdap (Adacel,Boostrix) 09/21/2010     Typhoid IM 12/16/2015     Zoster vaccine recombinant adjuvanted (SHINGRIX) 08/31/2018   immunizations updated.

## 2018-10-22 ENCOUNTER — TELEPHONE (OUTPATIENT)
Dept: FAMILY MEDICINE | Facility: CLINIC | Age: 58
End: 2018-10-22

## 2018-10-22 DIAGNOSIS — Z17.0 MALIGNANT NEOPLASM OF RIGHT BREAST IN FEMALE, ESTROGEN RECEPTOR POSITIVE, UNSPECIFIED SITE OF BREAST (H): Primary | ICD-10-CM

## 2018-10-22 DIAGNOSIS — C50.911 MALIGNANT NEOPLASM OF RIGHT BREAST IN FEMALE, ESTROGEN RECEPTOR POSITIVE, UNSPECIFIED SITE OF BREAST (H): Primary | ICD-10-CM

## 2018-10-23 NOTE — TELEPHONE ENCOUNTER
Not aware of any plan covering 6; will write for 2 and refill; many plans are per year, others are 6 months.      Please fax to number provided.

## 2018-10-30 ENCOUNTER — MYC MEDICAL ADVICE (OUTPATIENT)
Dept: FAMILY MEDICINE | Facility: CLINIC | Age: 58
End: 2018-10-30

## 2018-11-02 ENCOUNTER — ALLIED HEALTH/NURSE VISIT (OUTPATIENT)
Dept: NURSING | Facility: CLINIC | Age: 58
End: 2018-11-02
Payer: COMMERCIAL

## 2018-11-02 DIAGNOSIS — Z13.6 CARDIOVASCULAR SCREENING; LDL GOAL LESS THAN 160: ICD-10-CM

## 2018-11-02 DIAGNOSIS — Z23 NEED FOR VACCINATION: Primary | ICD-10-CM

## 2018-11-02 LAB
ALBUMIN SERPL-MCNC: 3.8 G/DL (ref 3.4–5)
ALP SERPL-CCNC: 82 U/L (ref 40–150)
ALT SERPL W P-5'-P-CCNC: 36 U/L (ref 0–50)
ANION GAP SERPL CALCULATED.3IONS-SCNC: 11 MMOL/L (ref 3–14)
AST SERPL W P-5'-P-CCNC: 25 U/L (ref 0–45)
BILIRUB SERPL-MCNC: 0.4 MG/DL (ref 0.2–1.3)
BUN SERPL-MCNC: 10 MG/DL (ref 7–30)
CALCIUM SERPL-MCNC: 9.2 MG/DL (ref 8.5–10.1)
CHLORIDE SERPL-SCNC: 105 MMOL/L (ref 94–109)
CHOLEST SERPL-MCNC: 156 MG/DL
CO2 SERPL-SCNC: 24 MMOL/L (ref 20–32)
CREAT SERPL-MCNC: 0.78 MG/DL (ref 0.52–1.04)
GFR SERPL CREATININE-BSD FRML MDRD: 75 ML/MIN/1.7M2
GLUCOSE SERPL-MCNC: 97 MG/DL (ref 70–99)
HDLC SERPL-MCNC: 47 MG/DL
LDLC SERPL CALC-MCNC: 70 MG/DL
NONHDLC SERPL-MCNC: 109 MG/DL
POTASSIUM SERPL-SCNC: 4.2 MMOL/L (ref 3.4–5.3)
PROT SERPL-MCNC: 7.9 G/DL (ref 6.8–8.8)
SODIUM SERPL-SCNC: 140 MMOL/L (ref 133–144)
TRIGL SERPL-MCNC: 194 MG/DL

## 2018-11-02 PROCEDURE — 90750 HZV VACC RECOMBINANT IM: CPT

## 2018-11-02 PROCEDURE — 80061 LIPID PANEL: CPT | Performed by: INTERNAL MEDICINE

## 2018-11-02 PROCEDURE — 80053 COMPREHEN METABOLIC PANEL: CPT | Performed by: INTERNAL MEDICINE

## 2018-11-02 PROCEDURE — 90471 IMMUNIZATION ADMIN: CPT

## 2018-11-02 PROCEDURE — 36415 COLL VENOUS BLD VENIPUNCTURE: CPT | Performed by: INTERNAL MEDICINE

## 2018-11-02 PROCEDURE — 99207 ZZC NO CHARGE NURSE ONLY: CPT

## 2018-11-02 NOTE — PROGRESS NOTES
"  Injectable Influenza Immunization Documentation    1.  Is the person to be vaccinated sick today?  {YES/NO DEFAULT NO:11593::\" No\"}    2. Does the person to be vaccinated have an allergy to a component   of the vaccine?  {YES/NO DEFAULT NO:74761::\" No\"}  Egg Allergy Algorithm Link    3. Has the person to be vaccinated ever had a serious reaction   to influenza vaccine in the past?  {YES/NO DEFAULT NO:38654::\" No\"}    4. Has the person to be vaccinated ever had Guillain-Barré syndrome?  {YES/NO DEFAULT NO:23674::\" No\"}    Form completed by ***         "

## 2018-11-02 NOTE — MR AVS SNAPSHOT
After Visit Summary   11/2/2018    Veronika Lucia    MRN: [id number]           Patient Information     Date Of Birth          1960        Visit Information        Provider Department      11/2/2018 8:30 AM  NURSE [facility] Castillo        Today's Diagnoses     Need for vaccination    -  1       Follow-ups after your visit        Who to contact     If you have questions or need follow up information about today's clinic visit or your schedule please contact [facility] directly at [phone].  Normal or non-critical lab and imaging results will be communicated to you by [employer], letter or phone within 4 business days after the clinic has received the results. If you do not hear from us within 7 days, please contact the clinic through [employer] or phone. If you have a critical or abnormal lab result, we will notify you by phone as soon as possible.  Submit refill requests through [employer] or call your pharmacy and they will forward the refill request to us. Please allow 3 business days for your refill to be completed.          Additional Information About Your Visit        MyChart Information     [employer] gives you secure access to your electronic health record. If you see a primary care provider, you can also send messages to your care team and make appointments. If you have questions, please call your primary care clinic.  If you do not have a primary care provider, please call [phone] and they will assist you.        Care EveryWhere ID     This is your Care EveryWhere ID. This could be used by other organizations to access your [facility] medical records  [id number]         Blood Pressure from Last 3 Encounters:   06/06/18 118/78   04/02/18 118/78   02/21/18 118/78    Weight from Last 3 Encounters:   06/06/18 132 lb (59.9 kg)   04/02/18 134 lb 14.4 oz (61.2 kg)   02/21/18 135 lb 4.8 oz (61.4 kg)              We Performed the Following     SCREENING QUESTIONS FOR ADULT  IMMUNIZATIONS     Vaccine Administration, Initial [41916]     ZOSTER VACCINE RECOMBINANT ADJUVANTED IM NJX        Primary Care Provider Office Phone # Fax #    Mechelle Montgomery [phone] [address]  [facility] [address]        Equal Access to Services     [facility] : Hadii aad ku hadasho Soomaali, waaxda luqadaha, qaybta kaalmada adeegyada, waxay idiin hayaan adeeg khjairo lakristian . So [facility] [phone].    ATENCIÓN: Si habla español, tiene a woodward disposición servicios gratuitos de asistencia lingüística. Llame al [phone].    We comply with applicable federal civil rights laws and [place] laws. We do not discriminate on the basis of race, color, national origin, age, disability, sex, sexual orientation, or gender identity.            Thank you!     Thank you for choosing [facility]  for your care. Our goal is always to provide you with excellent care. Hearing back from our patients is one way we can continue to improve our services. Please take a few minutes to complete the written survey that you may receive in the mail after your visit with us. Thank you!             Your Updated Medication List - Protect others around you: Learn how to safely use, store and throw away your medicines at www.disposemymeds.org.          This list is accurate as of 11/2/18  8:44 AM.  Always use your most recent med list.                   Brand Name Dispense Instructions for use Diagnosis    alendronate 70 MG tablet    FOSAMAX    12 tablet    Take 1 tablet (70 mg) by mouth with 8oz water every 7 days 30-45 minutes before breakfast and remain upright during this time.    Age-related osteoporosis without current pathological fracture       atorvastatin 10 MG tablet    LIPITOR    90 tablet    Take 1 tablet (10 mg) by mouth daily This should replace Atorvastatin 20 mg dose.    Elevated cholesterol       LANsoprazole 15 MG CR capsule    PREVACID     Take 30 mg by mouth         levothyroxine 75 MCG tablet    SYNTHROID/LEVOTHROID    90 tablet    TAKE 1 TABLET(75 MCG) BY MOUTH DAILY    Hypothyroidism, unspecified type       order for DME     2 each    Equipment being ordered: prosthesis and  post mastectomy bras Please clarify quantity availability; pt requesting 6    Malignant neoplasm of right breast in female, estrogen receptor positive, unspecified site of breast (H)

## 2018-12-15 DIAGNOSIS — E78.00 ELEVATED CHOLESTEROL: ICD-10-CM

## 2018-12-15 NOTE — TELEPHONE ENCOUNTER
"Requested Prescriptions   Pending Prescriptions Disp Refills     atorvastatin (LIPITOR) 10 MG tablet [Pharmacy Med Name: ATORVASTATIN 10MG TABLETS]  Last Written Prescription Date:  06/25/2018  Last Fill Quantity: 90 tablet,  # refills: 1   Last office visit: 6/25/2018 with prescribing provider:  Mechelle Montgomery MD    Future Office Visit:     90 tablet 0     Sig: TAKE 1 TABLET BY MOUTH DAILY    Statins Protocol Passed - 12/15/2018 11:19 AM       Passed - LDL on file in past 12 months    Recent Labs   Lab Test 11/02/18  0808   LDL 70            Passed - No abnormal creatine kinase in past 12 months    No lab results found.            Passed - Recent (12 mo) or future (30 days) visit within the authorizing provider's specialty    Patient had office visit in the last 12 months or has a visit in the next 30 days with authorizing provider or within the authorizing provider's specialty.  See \"Patient Info\" tab in inbasket, or \"Choose Columns\" in Meds & Orders section of the refill encounter.             Passed - Patient is age 18 or older       Passed - No active pregnancy on record       Passed - No positive pregnancy test in past 12 months          "

## 2018-12-18 RX ORDER — ATORVASTATIN CALCIUM 10 MG/1
TABLET, FILM COATED ORAL
Qty: 90 TABLET | Refills: 1 | Status: SHIPPED | OUTPATIENT
Start: 2018-12-18 | End: 2019-04-03

## 2018-12-18 NOTE — TELEPHONE ENCOUNTER
Prescription approved per [facility], [facility] or [facility] refill protocol.  Margie WREN RN - Triage  [facility]

## 2019-03-20 ENCOUNTER — HOSPITAL ENCOUNTER (OUTPATIENT)
Dept: MAMMOGRAPHY | Facility: CLINIC | Age: 59
Discharge: HOME | End: 2019-03-20
Attending: INTERNAL MEDICINE | Admitting: INTERNAL MEDICINE
Payer: COMMERCIAL

## 2019-03-20 DIAGNOSIS — Z12.31 VISIT FOR SCREENING MAMMOGRAM: ICD-10-CM

## 2019-03-20 PROCEDURE — 77067 SCR MAMMO BI INCL CAD: CPT | Mod: 52

## 2019-03-22 DIAGNOSIS — E03.9 HYPOTHYROIDISM, UNSPECIFIED TYPE: ICD-10-CM

## 2019-03-22 NOTE — TELEPHONE ENCOUNTER
"Requested Prescriptions   Pending Prescriptions Disp Refills     levothyroxine (SYNTHROID/LEVOTHROID) 75 MCG tablet [Pharmacy Med Name: LEVOTHYROXINE 0.075MG (75MCG) TABS]  Last Written Prescription Date:  4/2/18  Last Fill Quantity: 90,  # refills: 3   Last office visit: 6/25/2018 with prescribing provider:  Mechelle Montgomery MD   Future Office Visit:   Next 5 appointments (look out 90 days)    Apr 03, 2019  8:30 AM CDT  PHYSICAL with Mechelle Montgomery MD  [facility] ([address] [address] [phone]          90 tablet 0     Sig: TAKE 1 TABLET(75 MCG) BY MOUTH DAILY    Thyroid Protocol Passed - 3/22/2019  3:57 PM       Passed - Patient is 12 years or older       Passed - Recent (12 mo) or future (30 days) visit within the authorizing provider's specialty    Patient had office visit in the last 12 months or has a visit in the next 30 days with authorizing provider or within the authorizing provider's specialty.  See \"Patient Info\" tab in inbasket, or \"Choose Columns\" in Meds & Orders section of the refill encounter.             Passed - Medication is active on med list       Passed - Normal TSH on file in past 12 months    Recent Labs   Lab Test 04/02/18  0921   TSH 3.44             Passed - No active pregnancy on record    If patient is pregnant or has had a positive pregnancy test, please check TSH.         Passed - No positive pregnancy test in past 12 months    If patient is pregnant or has had a positive pregnancy test, please check TSH.            "

## 2019-03-25 RX ORDER — LEVOTHYROXINE SODIUM 75 UG/1
TABLET ORAL
Qty: 30 TABLET | Refills: 0 | Status: SHIPPED | OUTPATIENT
Start: 2019-03-25 | End: 2019-04-03

## 2019-04-03 ENCOUNTER — OFFICE VISIT (OUTPATIENT)
Dept: FAMILY MEDICINE | Facility: CLINIC | Age: 59
End: 2019-04-03
Payer: COMMERCIAL

## 2019-04-03 ENCOUNTER — THERAPY VISIT (OUTPATIENT)
Dept: PHYSICAL THERAPY | Facility: CLINIC | Age: 59
End: 2019-04-03
Payer: COMMERCIAL

## 2019-04-03 VITALS
TEMPERATURE: 98.1 F | RESPIRATION RATE: 16 BRPM | BODY MASS INDEX: 27.62 KG/M2 | DIASTOLIC BLOOD PRESSURE: 64 MMHG | OXYGEN SATURATION: 100 % | HEIGHT: 58 IN | SYSTOLIC BLOOD PRESSURE: 108 MMHG | WEIGHT: 131.6 LBS | HEART RATE: 68 BPM

## 2019-04-03 DIAGNOSIS — E03.9 HYPOTHYROIDISM, UNSPECIFIED TYPE: ICD-10-CM

## 2019-04-03 DIAGNOSIS — M25.512 ACUTE PAIN OF LEFT SHOULDER: ICD-10-CM

## 2019-04-03 DIAGNOSIS — E78.00 ELEVATED CHOLESTEROL: ICD-10-CM

## 2019-04-03 DIAGNOSIS — R10.13 EPIGASTRIC PAIN: ICD-10-CM

## 2019-04-03 DIAGNOSIS — Z17.0 MALIGNANT NEOPLASM OF RIGHT BREAST IN FEMALE, ESTROGEN RECEPTOR POSITIVE, UNSPECIFIED SITE OF BREAST (H): ICD-10-CM

## 2019-04-03 DIAGNOSIS — M54.2 NECK PAIN: ICD-10-CM

## 2019-04-03 DIAGNOSIS — R73.03 PREDIABETES: ICD-10-CM

## 2019-04-03 DIAGNOSIS — C50.911 MALIGNANT NEOPLASM OF RIGHT BREAST IN FEMALE, ESTROGEN RECEPTOR POSITIVE, UNSPECIFIED SITE OF BREAST (H): ICD-10-CM

## 2019-04-03 DIAGNOSIS — Z00.00 ROUTINE HISTORY AND PHYSICAL EXAMINATION OF ADULT: Primary | ICD-10-CM

## 2019-04-03 DIAGNOSIS — M81.0 AGE-RELATED OSTEOPOROSIS WITHOUT CURRENT PATHOLOGICAL FRACTURE: ICD-10-CM

## 2019-04-03 LAB
ALBUMIN SERPL-MCNC: 4 G/DL (ref 3.4–5)
ALP SERPL-CCNC: 87 U/L (ref 40–150)
ALT SERPL W P-5'-P-CCNC: 26 U/L (ref 0–50)
ANION GAP SERPL CALCULATED.3IONS-SCNC: 7 MMOL/L (ref 3–14)
AST SERPL W P-5'-P-CCNC: 20 U/L (ref 0–45)
BILIRUB SERPL-MCNC: 0.4 MG/DL (ref 0.2–1.3)
BUN SERPL-MCNC: 9 MG/DL (ref 7–30)
CALCIUM SERPL-MCNC: 9.7 MG/DL (ref 8.5–10.1)
CHLORIDE SERPL-SCNC: 110 MMOL/L (ref 94–109)
CHOLEST SERPL-MCNC: 152 MG/DL
CO2 SERPL-SCNC: 24 MMOL/L (ref 20–32)
CREAT SERPL-MCNC: 0.74 MG/DL (ref 0.52–1.04)
GFR SERPL CREATININE-BSD FRML MDRD: 88 ML/MIN/{1.73_M2}
GLUCOSE SERPL-MCNC: 102 MG/DL (ref 70–99)
HBA1C MFR BLD: 5.8 % (ref 0–5.6)
HDLC SERPL-MCNC: 45 MG/DL
LDLC SERPL CALC-MCNC: 77 MG/DL
NONHDLC SERPL-MCNC: 107 MG/DL
POTASSIUM SERPL-SCNC: 4 MMOL/L (ref 3.4–5.3)
PROT SERPL-MCNC: 8.1 G/DL (ref 6.8–8.8)
SODIUM SERPL-SCNC: 141 MMOL/L (ref 133–144)
TRIGL SERPL-MCNC: 148 MG/DL
TSH SERPL DL<=0.005 MIU/L-ACNC: 1.47 MU/L (ref 0.4–4)

## 2019-04-03 PROCEDURE — 84443 ASSAY THYROID STIM HORMONE: CPT | Performed by: INTERNAL MEDICINE

## 2019-04-03 PROCEDURE — 97162 PT EVAL MOD COMPLEX 30 MIN: CPT | Mod: GP | Performed by: PHYSICAL THERAPIST

## 2019-04-03 PROCEDURE — 99214 OFFICE O/P EST MOD 30 MIN: CPT | Mod: 25 | Performed by: INTERNAL MEDICINE

## 2019-04-03 PROCEDURE — 97110 THERAPEUTIC EXERCISES: CPT | Mod: GP | Performed by: PHYSICAL THERAPIST

## 2019-04-03 PROCEDURE — 80053 COMPREHEN METABOLIC PANEL: CPT | Performed by: INTERNAL MEDICINE

## 2019-04-03 PROCEDURE — 97112 NEUROMUSCULAR REEDUCATION: CPT | Mod: GP | Performed by: PHYSICAL THERAPIST

## 2019-04-03 PROCEDURE — 80061 LIPID PANEL: CPT | Performed by: INTERNAL MEDICINE

## 2019-04-03 PROCEDURE — 83036 HEMOGLOBIN GLYCOSYLATED A1C: CPT | Performed by: INTERNAL MEDICINE

## 2019-04-03 PROCEDURE — 99396 PREV VISIT EST AGE 40-64: CPT | Performed by: INTERNAL MEDICINE

## 2019-04-03 PROCEDURE — 36415 COLL VENOUS BLD VENIPUNCTURE: CPT | Performed by: INTERNAL MEDICINE

## 2019-04-03 RX ORDER — ALENDRONATE SODIUM 70 MG/1
TABLET ORAL
Qty: 12 TABLET | Refills: 3 | Status: SHIPPED | OUTPATIENT
Start: 2019-04-03 | End: 2019-08-12

## 2019-04-03 RX ORDER — ATORVASTATIN CALCIUM 10 MG/1
10 TABLET, FILM COATED ORAL DAILY
Qty: 90 TABLET | Refills: 3 | Status: SHIPPED | OUTPATIENT
Start: 2019-04-03 | End: 2020-04-09

## 2019-04-03 RX ORDER — LEVOTHYROXINE SODIUM 75 UG/1
75 TABLET ORAL DAILY
Qty: 90 TABLET | Refills: 3 | Status: SHIPPED | OUTPATIENT
Start: 2019-04-03 | End: 2020-04-08

## 2019-04-03 RX ORDER — CHOLECALCIFEROL (VITAMIN D3) 50 MCG
1 TABLET ORAL DAILY
Qty: 100 TABLET | Refills: 3 | COMMUNITY
Start: 2019-04-03 | End: 2020-04-27

## 2019-04-03 ASSESSMENT — ENCOUNTER SYMPTOMS
DIARRHEA: 0
HEMATOCHEZIA: 0
CONSTIPATION: 0
FEVER: 0
HEMATURIA: 0
COUGH: 0
DIZZINESS: 0
NERVOUS/ANXIOUS: 0
CHILLS: 0
FREQUENCY: 0
ABDOMINAL PAIN: 0
EYE PAIN: 0

## 2019-04-03 ASSESSMENT — MIFFLIN-ST. JEOR: SCORE: 1052.71

## 2019-04-03 NOTE — PATIENT INSTRUCTIONS

## 2019-04-03 NOTE — PROGRESS NOTES
Chief Complaint   Patient presents with     Physical     pt fasting      Recheck Medication     pt has not started taking her Fosamax but would like a new Rx and may start taking it now.      Lipids     Thyroid Problem        SUBJECTIVE:   CC: Veronika Lucia is an 58 year old woman who presents for preventive health visit.     Physical   Annual:     Getting at least 3 servings of Calcium per day:  Yes    Bi-annual eye exam:  Yes    Dental care twice a year:  Yes    Sleep apnea or symptoms of sleep apnea:  None    Diet:  Vegetarian/vegan    Frequency of exercise:  4-5 days/week    Duration of exercise:  15-30 minutes    Taking medications regularly:  Yes    Medication side effects:  Muscle aches    Additional concerns today:  Yes    PHQ-2 Total Score: 0        Hyperlipidemia Follow-Up      Rate your low fat/cholesterol diet?: good    Taking statin?  Yes, no muscle aches from statin- Lipitor 10 mg  Recent Labs   Lab Test 11/02/18  0808 04/02/18  0921  02/12/15  0849 02/14/14  0807   CHOL 156 279*   < > 252* 230*   HDL 47* 41*   < > 56 46*   LDL 70 192*   < > 150* 151*   TRIG 194* 231*   < > 228* 165*   CHOLHDLRATIO  --   --   --  4.5 5.0    < > = values in this interval not displayed.             Other lipid medications/supplements?:  none    Hypothyroidism Follow-up      Since last visit, patient describes the following symptoms: Weight stable, no hair loss, no skin changes, no constipation, no loose stools  TSH   Date Value Ref Range Status   04/02/2018 3.44 0.40 - 4.00 mU/L Final      T4 Free   Date Value Ref Range Status   04/02/2018 1.10 0.76 - 1.46 ng/dL Final               Today's PHQ-2 Score:   PHQ-2 ( 1999 [employer]) 4/3/2019   Q1: Little interest or pleasure in doing things 0   Q2: Feeling down, depressed or hopeless 0   PHQ-2 Score 0   Q1: Little interest or pleasure in doing things Not at all   Q2: Feeling down, depressed or hopeless Not at all   PHQ-2 Score 0       Abuse: Current or Past(Physical, Sexual or  Emotional)- No  Do you feel safe in your environment? Yes    Social History     Tobacco Use     Smoking status: Never Smoker     Smokeless tobacco: Never Used   Substance Use Topics     Alcohol use: No     Alcohol Use 4/3/2019   If you drink alcohol do you typically have greater than 3 drinks per day OR greater than 7 drinks per week? Not Applicable       Reviewed orders with patient.  Reviewed health maintenance and updated orders accordingly - Yes  Labs reviewed in EPIC  BP Readings from Last 3 Encounters:   04/03/19 108/64   06/06/18 118/78   04/02/18 118/78    Wt Readings from Last 3 Encounters:   04/03/19 59.7 kg (131 lb 9.6 oz)   06/06/18 59.9 kg (132 lb)   04/02/18 61.2 kg (134 lb 14.4 oz)                  Patient Active Problem List   Diagnosis     Hypothyroidism     Breast cancer (H)     CARDIOVASCULAR SCREENING; LDL GOAL LESS THAN 160     Vitamin D deficiency     Curran's esophagus     GERD (gastroesophageal reflux disease)     Osteoporosis     Acute left-sided low back pain with left-sided sciatica     Past Surgical History:   Procedure Laterality Date     HYSTERECTOMY TOTAL ABDOMINAL, BILATERAL SALPINGO-OOPHORECTOMY, COMBINED  2010    Dr. Turner fibroids; [facility]     obgyn  2007    right sided mastectomy        Social History     Tobacco Use     Smoking status: Never Smoker     Smokeless tobacco: Never Used   Substance Use Topics     Alcohol use: No     Family History   Problem Relation Age of Onset     Cancer Mother         oral cancer     Hyperlipidemia Brother      Hyperlipidemia Sister          Current Outpatient Medications   Medication Sig Dispense Refill     atorvastatin (LIPITOR) 10 MG tablet TAKE 1 TABLET BY MOUTH DAILY 90 tablet 1     LANsoprazole (PREVACID) 15 MG CR capsule Take 30 mg by mouth       levothyroxine (SYNTHROID/LEVOTHROID) 75 MCG tablet TAKE 1 TABLET(75 MCG) BY MOUTH DAILY 30 tablet 0     order for DME Equipment being ordered: prosthesis and  post mastectomy bras  Please clarify  "quantity availability; pt requesting 6 2 each 1     alendronate (FOSAMAX) 70 MG tablet Take 1 tablet (70 mg) by mouth with 8oz water every 7 days 30-45 minutes before breakfast and remain upright during this time. 12 tablet 3     Allergies   Allergen Reactions     Hydrocodone Hives and Itching       Mammogram Screening: Patient over age 50, mutual decision to screen reflected in health maintenance.    Pertinent mammograms are reviewed under the imaging tab.  History of abnormal Pap smear: Status post benign hysterectomy. Health Maintenance and Surgical History updated.     Reviewed and updated as needed this visit by clinical staff  Tobacco  Allergies  Meds  Problems  Med Hx  Surg Hx  Fam Hx  Soc Hx          Reviewed and updated as needed this visit by Provider  Tobacco  Allergies  Meds  Problems  Med Hx  Surg Hx  Fam Hx        Past Medical History:   Diagnosis Date     Breast cancer (H) 2007    right mastectomy, Tamoxifen for 5 years; ; Dr. Denton     DVT (deep venous thrombosis) (H) 2009    left distal leg; following fracture/immobility; was also on Tamoxifen.     Thyroid disease 2000    Replacement therapy      Past Surgical History:   Procedure Laterality Date     HYSTERECTOMY TOTAL ABDOMINAL, BILATERAL SALPINGO-OOPHORECTOMY, COMBINED  2010    Dr. Turner fibroids; [facility]     obgyn  2007    right sided mastectomy        Review of Systems   Constitutional: Negative for chills and fever.   HENT: Negative for congestion and ear pain.    Eyes: Negative for pain.   Respiratory: Negative for cough.    Cardiovascular: Negative for chest pain.   Gastrointestinal: Negative for abdominal pain, constipation, diarrhea and hematochezia.   Genitourinary: Negative for frequency, genital sores and hematuria.   Neurological: Negative for dizziness.   Psychiatric/Behavioral: The patient is not nervous/anxious.           OBJECTIVE:   /64   Pulse 68   Temp 98.1  F (36.7  C) (Oral)   Resp 16   Ht 1.467 m (4' 9.75\") "   Wt 59.7 kg (131 lb 9.6 oz)   SpO2 100%   BMI 27.74 kg/m    Physical Exam  GENERAL: healthy, alert and no distress  HENT: ear canals and TM's normal, nose and mouth without ulcers or lesions  NECK: no adenopathy, no asymmetry, masses, or scars and thyroid normal to palpation  RESP: lungs clear to auscultation - no rales, rhonchi or wheezes  BREAST:  Right breast is surgically absent; chest wall is without masses. left breast normal without masses, tenderness or nipple discharge and no palpable axillary masses or adenopathy  CV: regular rates and rhythm, normal S1 S2, no S3 or S4, peripheral pulses strong and no peripheral edema  ABDOMEN: soft, nontender, no hepatosplenomegaly, no masses and bowel sounds normal  MS: left shoulder with end range pain; strength OK  SKIN: no suspicious lesions or rashes  NEURO: Normal strength and tone, mentation intact and speech normal  PSYCH: mentation appears normal, affect normal/bright  LYMPH: no cervical, supraclavicular, axillary adenopathy      ASSESSMENT/PLAN:   (Z00.00) Routine history and physical examination of adult  Comment: HEALTH CARE MAINTENANCE reviewed; mammo done recently and reviewed; colonoscopy due 2022 ( done 2017- 5 year f/u listed  Plan: as reviewed    (R73.03) Prediabetes   Comment:   Lab Results   Component Value Date    A1C 5.7 06/06/2018     Plan: Comprehensive metabolic panel, Hemoglobin A1c        Healthy diet and regular exercise encouraged- year round, not just in summer months.    (E03.9) Hypothyroidism, unspecified type  Comment: Clinically euthyroid; labs today, consider dose adjustment if labs warrant   Plan: levothyroxine (SYNTHROID/LEVOTHROID) 75 MCG         tablet, TSH with free T4 reflex, Comprehensive         metabolic panel          (E78.00) Elevated cholesterol  Comment: pt present with strong FH of high cholesterol; prefers low dose. Started Atorvastatin and it had improved.   Lab Results   Component Value Date    LDL 70 11/02/2018      04/02/2018     Labs due today; if similar results, continue the same; would not lower dose to 5 mg unless LDL is now less than 40  Plan: atorvastatin (LIPITOR) 10 MG tablet,         Comprehensive metabolic panel, Lipid panel         reflex to direct LDL Fasting          (M81.0) Age-related osteoporosis without current pathological fracture  Comment: reviewed and prescribed bisphosphonate therapy in June but apparently she opted NOT to take it. She is now willing to start the medication weekly. Also since her vit D has been on the low side, she should be taking Vit D 2000 international unit(s daily; also reviewed GI meds and should try H2 Blk rather than PPI  Plan: alendronate (FOSAMAX) 70 MG tablet, vitamin D3         (CHOLECALCIFEROL) 2000 units tablet          (C50.911,  Z17.0) Malignant neoplasm of right breast in female, estrogen receptor positive, unspecified site of breast (H)  Comment: Right DCIS; no recurrence; she is concerned that her sister (Denice) was recently dx with DCIS, stage 2  Plan:   Continue 3 D mammograms yearly; reviewed her recent report and discussed her concerns about noting heterogenous dense breast tissue.     (M25.512) Acute pain of left shoulder  Comment: no injury recalled; strength preserved.  Pt has set up physical therapy and now requests referral to [facility]; if not improved, then consider FSOC evaluation and treatment  Plan:  [facility] PT, HAND, AND CHIROPRACTIC REFERRAL    (R10.13) Epigastric pain  Comment: reviewed triggers, medications options; since she has osteoporosis, low vit D.   Med options reviewed; better to keep PPI use limited  Plan: ranitidine (ZANTAC) 150 MG tablet        Use of meds reviewed.      COUNSELING:  Reviewed preventive health counseling, as reflected in patient instructions       Regular exercise       Healthy diet/nutrition    BP Readings from Last 1 Encounters:   04/03/19 108/64     Estimated body mass index is 27.74 kg/m  as calculated from the  "following:    Height as of this encounter: 1.467 m (4' 9.75\").    Weight as of this encounter: 59.7 kg (131 lb 9.6 oz).      Weight management plan: Discussed healthy diet and exercise guidelines     reports that  has never smoked. she has never used smokeless tobacco.      Counseling Resources:  ATP IV Guidelines  Pooled Cohorts Equation Calculator  Breast Cancer Risk Calculator  FRAX Risk Assessment  ICSI Preventive Guidelines  Dietary Guidelines for Americans, 2010  USDA's MyPlate  ASA Prophylaxis  Lung CA Screening    Mechelle Montgomery MD  Internal Medicine   [facility] [place]    25 minutes in addition to HEALTH CARE MAINTENANCE are spent with patient, over 50% of that time spent providing counselling, discussing and reviewing medical conditions/concerns, meds and potential side effects.    "

## 2019-04-03 NOTE — PROGRESS NOTES
[facility] Initial Evaluation  Subjective:  The history is provided by the patient. No  was used.   Veronika Lucia is a 58 year old female with a left shoulder condition.  Condition occurred with:  Unknown cause.  Condition occurred: for unknown reasons.  This is a new condition  3/6/2019.      Radiates to:  Elbow, lower arm and upper arm (across front of neck/chest).  Pain is described as shooting and aching and is intermittent and constant and reported as 6/10 and 2/10.  Associated symptoms:  Loss of strength, loss of motion/stiffness and painful arc. Worse during: depends on activity.  Symptoms are exacerbated by using arm at shoulder level, using arm overhead, using arm behind back, lifting and carrying (driving) and relieved by heat (reaching behind back).  Since onset symptoms are gradually worsening.        General health as reported by patient is good.  Pertinent medical history includes:  Cancer, osteoporosis, thyroid problems and menopausal.    Other surgeries include:  Cancer surgery (mastectomy 12 years ago).    Current occupation is .  Patient is working in normal job without restrictions.  Employment tasks: computer work.    Barriers include:  None as reported by the patient.    Red flags:  None as reported by the patient.                        Objective:  System                   Shoulder Evaluation:  ROM:  AROM:    Flexion:  Left:  134 (ERP)    Right:  155  Extension: Left: 160 (PDM after 90 degrees)Right: 170                  Flexion/External Rotation:  Left:  T4 (+)    Right:  T4  Extension/Internal Rotation:  Left:  T5    Right:  T5          Strength:    Flexion: Left:5/5   Pain:    Right: 5/5     Pain:     Abduction:  Left: 5/5   Pain:++    Right: 5/5     Pain:    Internal Rotation:  Left:5/5     Pain:    Right: 5/5     Pain:  External Rotation:   Left:5/5     Pain:   Right:5/5     Pain:        Elbow Flexion:  Left:5/5     Pain:     Right:5/5      Pain:++                                             Nini Cervical Evaluation    Posture:  Sitting: fair    Protruding Head: yes  Wry Neck: no    Other Observations: slight dowager's hump  Movement Loss:    Flexion (Flex): nil  Retraction (RET): min and pain  Extension (EXT): min and pain  Lateral Flexion Right (LF R): nil  Lateral Flexion Left (LF L): nil  Rotation Right (ROT R): nil  Rotation Left (ROT L): min and pain  Test Movements:      RET: During: decreases  After: better  Mechanical Response: IncROM  Repeat RET: During: decreases  After: better  Mechanical Response: IncROM                          Conclusion: derangement  Principle of Treatment:      Extension: repeated cervical rerraction x 10 reps, 5 times/day                                             ROS    Assessment/Plan:    Patient is a 58 year old female with cervical and left side shoulder complaints.    Patient has the following significant findings with corresponding treatment plan.                Diagnosis 1:  Cervical/L shoulder pain secondary to cervical derangement  Pain -  directional preference exercise and home program  Decreased ROM/flexibility - manual therapy, therapeutic exercise and home program  Decreased function - therapeutic activities and home program  Impaired posture - neuro re-education and home program  Diagnosis 2:  L shoulder pain secondary to contractile dysfunction   Pain -  hot/cold therapy  Decreased ROM/flexibility - manual therapy, therapeutic exercise and home program  Decreased strength - therapeutic exercise, therapeutic activities and home program  Impaired muscle performance - neuro re-education and home program  Decreased function - therapeutic activities and home program    Therapy Evaluation Codes:   1) History comprised of:   Personal factors that impact the plan of care:      None.    Comorbidity factors that impact the plan of care are:      Cancer and osteoporosis, thyroid problems.      Medications impacting care: levothyroxin, atorvastatin.  2) Examination of Body Systems comprised of:   Body structures and functions that impact the plan of care:      Cervical spine and Shoulder.   Activity limitations that impact the plan of care are:      Driving, Dressing, Lifting and reaching.  3) Clinical presentation characteristics are:   Evolving/Changing.  4) Decision-Making    Moderate complexity using standardized patient assessment instrument and/or measureable assessment of functional outcome.  Cumulative Therapy Evaluation is: Moderate complexity.    Previous and current functional limitations:  (See Goal Flow Sheet for this information)    Short term and Long term goals: (See Goal Flow Sheet for this information)     Communication ability:  Patient appears to be able to clearly communicate and understand verbal and written communication and follow directions correctly.  Treatment Explanation - The following has been discussed with the patient:   RX ordered/plan of care  Anticipated outcomes  Possible risks and side effects  This patient would benefit from PT intervention to resume normal activities.   Rehab potential is good.    Frequency:  1 X week, once daily  Duration:  for 6-8 weeks  Discharge Plan:  Achieve all LTG.  Independent in home treatment program.  Reach maximal therapeutic benefit.    Please refer to the daily flowsheet for treatment today, total treatment time and time spent performing 1:1 timed codes.

## 2019-04-08 ENCOUNTER — THERAPY VISIT (OUTPATIENT)
Dept: PHYSICAL THERAPY | Facility: CLINIC | Age: 59
End: 2019-04-08
Payer: COMMERCIAL

## 2019-04-08 ENCOUNTER — TELEPHONE (OUTPATIENT)
Dept: FAMILY MEDICINE | Facility: CLINIC | Age: 59
End: 2019-04-08

## 2019-04-08 DIAGNOSIS — M25.512 LEFT SHOULDER PAIN, UNSPECIFIED CHRONICITY: Primary | ICD-10-CM

## 2019-04-08 DIAGNOSIS — M54.2 NECK PAIN: ICD-10-CM

## 2019-04-08 PROCEDURE — 97112 NEUROMUSCULAR REEDUCATION: CPT | Mod: GP | Performed by: PHYSICAL THERAPIST

## 2019-04-08 PROCEDURE — 97110 THERAPEUTIC EXERCISES: CPT | Mod: GP | Performed by: PHYSICAL THERAPIST

## 2019-04-08 NOTE — TELEPHONE ENCOUNTER
Reason for call:  Form   Our goal is to have forms completed within 72 hours, however some forms may require a visit or additional information.     Who is the form from? Patient  Where did the form come from? Patient or family brought in     What clinic location was the form placed at? [facility] [place]  Where was the form placed? 's Box  What number is listed as a contact on the form? [phone]    Phone call message - patient request for a letter, form or note:     Date needed: at your convenience  Please fax to [phone]  Has the patient signed a consent form for release of information? Not Applicable    Additional comments: NONE    Type of letter, form or note: medical    Phone number to reach patient:  Home number on file [phone] (home)    Best Time:  ANY    Can we leave a detailed message on this number?  YES

## 2019-04-15 ENCOUNTER — THERAPY VISIT (OUTPATIENT)
Dept: PHYSICAL THERAPY | Facility: CLINIC | Age: 59
End: 2019-04-15
Payer: COMMERCIAL

## 2019-04-15 DIAGNOSIS — M54.2 NECK PAIN: ICD-10-CM

## 2019-04-15 PROCEDURE — 97112 NEUROMUSCULAR REEDUCATION: CPT | Mod: GP | Performed by: PHYSICAL THERAPIST

## 2019-04-15 PROCEDURE — 97110 THERAPEUTIC EXERCISES: CPT | Mod: GP | Performed by: PHYSICAL THERAPIST

## 2019-08-12 ENCOUNTER — OFFICE VISIT (OUTPATIENT)
Dept: FAMILY MEDICINE | Facility: CLINIC | Age: 59
End: 2019-08-12
Payer: COMMERCIAL

## 2019-08-12 VITALS
SYSTOLIC BLOOD PRESSURE: 112 MMHG | OXYGEN SATURATION: 100 % | BODY MASS INDEX: 28.21 KG/M2 | DIASTOLIC BLOOD PRESSURE: 74 MMHG | HEART RATE: 64 BPM | RESPIRATION RATE: 16 BRPM | TEMPERATURE: 97.5 F | WEIGHT: 133.8 LBS

## 2019-08-12 DIAGNOSIS — L85.3 DRY SKIN: ICD-10-CM

## 2019-08-12 DIAGNOSIS — R59.9 REACTIVE LYMPHADENOPATHY: ICD-10-CM

## 2019-08-12 DIAGNOSIS — J30.2 SEASONAL ALLERGIC RHINITIS, UNSPECIFIED TRIGGER: ICD-10-CM

## 2019-08-12 DIAGNOSIS — M81.0 AGE-RELATED OSTEOPOROSIS WITHOUT CURRENT PATHOLOGICAL FRACTURE: Primary | ICD-10-CM

## 2019-08-12 PROCEDURE — 99214 OFFICE O/P EST MOD 30 MIN: CPT | Performed by: INTERNAL MEDICINE

## 2019-08-12 NOTE — PATIENT INSTRUCTIONS
Allergy symptoms   Claritin 10 mg BID for several weeks then weekly  Since you have allergy appt set up, wait until that evaluation is complete.     Aquafor lotion      Osteoporosis  Could consider Reclast yearly infusion/injection

## 2019-08-12 NOTE — PROGRESS NOTES
Subjective     Veronika Lucia is a 59 year old female who presents to clinic today for the following health issues:    HPI   Pt states has stopped Fosamax due to possible stomach concerns.  Has been off about 1 month.  Spring 2015- initially discussed taking Bisphosphonate therapy. She has not taken it consistently in the past 4 years; currently off this medication.      How many servings of fruits and vegetables do you eat daily?  4 or more    On average, how many sweetened beverages do you drink each day (soda, juice, sweet tea, etc)?   2    How many days per week do you miss taking your medication? 0    Lump: on the right side of neck and has been present about 1 month     Itching   ( intermittent rash on hips and thighs)       Duration: about 1 month     Description  Location: around her neck and on the rest of her body  Itching: mild    Intensity:  mild    Accompanying signs and symptoms: sometimes gets small bumps on hips and thighs but around neck and on her arms is just itchy     History (similar episodes/previous evaluation): None    Precipitating or alleviating factors:  New exposures:  None  Recent travel: no      Therapies tried and outcome: none        Patient Active Problem List   Diagnosis     Hypothyroidism     Breast cancer (H)     CARDIOVASCULAR SCREENING; LDL GOAL LESS THAN 160     Vitamin D deficiency     Curran's esophagus     GERD (gastroesophageal reflux disease)     Osteoporosis     Acute left-sided low back pain with left-sided sciatica     Acute pain of left shoulder     Elevated cholesterol     Prediabetes     Past Surgical History:   Procedure Laterality Date     HYSTERECTOMY TOTAL ABDOMINAL, BILATERAL SALPINGO-OOPHORECTOMY, COMBINED  2010    Dr. Turner fibroids; [facility]     obgyn  2007    right sided mastectomy        Social History     Tobacco Use     Smoking status: Never Smoker     Smokeless tobacco: Never Used   Substance Use Topics     Alcohol use: No     Family History   Problem  Relation Age of Onset     Cancer Mother         oral cancer     Hyperlipidemia Brother      Hyperlipidemia Sister          Current Outpatient Medications   Medication Sig Dispense Refill     atorvastatin (LIPITOR) 10 MG tablet Take 1 tablet (10 mg) by mouth daily 90 tablet 3     LANsoprazole (PREVACID) 15 MG CR capsule Take 30 mg by mouth       levothyroxine (SYNTHROID/LEVOTHROID) 75 MCG tablet Take 1 tablet (75 mcg) by mouth daily 90 tablet 3     order for DME Equipment being ordered: prosthesis and  post mastectomy bras  Please clarify quantity availability; pt requesting 6 2 each 1     ranitidine (ZANTAC) 150 MG tablet Take 1 tablet (150 mg) by mouth daily as needed for heartburn 100 tablet 1     vitamin D3 (CHOLECALCIFEROL) 2000 units tablet Take 1 tablet by mouth daily 100 tablet 3     Allergies   Allergen Reactions     Hydrocodone Hives and Itching     Recent Labs   Lab Test 04/03/19  0926 11/02/18  0808 06/06/18  1701 04/02/18  0921   A1C 5.8*  --  5.7*  --    LDL 77 70  --  192*   HDL 45* 47*  --  41*   TRIG 148 194*  --  231*   ALT 26 36  --  22   CR 0.74 0.78  --  0.82   GFRESTIMATED 88 75  --  71   GFRESTBLACK >90 >90  --  86   POTASSIUM 4.0 4.2  --  4.1   TSH 1.47  --   --  3.44      BP Readings from Last 3 Encounters:   08/12/19 112/74   04/03/19 108/64   06/06/18 118/78    Wt Readings from Last 3 Encounters:   08/12/19 60.7 kg (133 lb 12.8 oz)   04/03/19 59.7 kg (131 lb 9.6 oz)   06/06/18 59.9 kg (132 lb)            Reviewed and updated as needed this visit by Provider  Tobacco  Allergies  Meds  Problems  Med Hx  Surg Hx  Fam Hx         Review of Systems   ROS COMP: CONSTITUTIONAL: NEGATIVE for fever, chills, change in weight  ENT/MOUTH: NEGATIVE for ear, mouth and throat problems  RESP: NEGATIVE for significant cough or SOB  CV: NEGATIVE for chest pain, palpitations or peripheral edema  GI: NEGATIVE for nausea, abdominal pain, heartburn, or change in bowel habits  MUSCULOSKELETAL: NEGATIVE for  significant arthralgias or myalgia  NEURO: NEGATIVE for weakness, dizziness or paresthesias  ENDOCRINE: reviewed osteoprosis  HEME/ALLERGY/IMMUNE: NEGATIVE for bleeding problems  PSYCHIATRIC: NEGATIVE for changes in mood or affect      Objective    /74   Pulse 64   Temp 97.5  F (36.4  C) (Oral)   Resp 16   Wt 60.7 kg (133 lb 12.8 oz)   SpO2 100%   BMI 28.21 kg/m     Body mass index is 28.21 kg/m .  Physical Exam   GENERAL: healthy, alert and no distress  NECK: no adenopathy, no asymmetry, masses, or scars and thyroid normal to palpation  RESP: lungs clear to auscultation - no rales, rhonchi or wheezes  CV: regular rate and rhythm, normal S1 S2, no S3 or S4, no murmur, click or rub, no peripheral edema and peripheral pulses strong  ABDOMEN: soft, nontender, no hepatosplenomegaly, no masses and bowel sounds normal  MS: no gross musculoskeletal defects noted, no edema  SKIN: dermatographism with light scratch.   NEURO: Normal strength and tone, mentation intact and speech normal  PSYCH: mentation appears normal, affect normal/bright    Diagnostic Test Results:  Labs reviewed in Epic        Assessment & Plan     (M81.0) Age-related osteoporosis without current pathological fracture  (primary encounter diagnosis)  Comment: she has been prescribed bisphosphonate therapy off and on over the past 4 years.  Plan: she is interested in considering Reclast in the future; in the interim, it is recommended that she continue with regular exercise, adequate calcium and vit D.     (R59.9) Reactive lymphadenopathy  Comment: right neck one cm nodule- appears reactive  Plan: anticipate resolution in a few weeks    (L85.3) Dry skin  Comment: Aquafor lotion  Plan: moisturize skin    (J30.2) Seasonal allergic rhinitis, unspecified trigger  Comment: reviewed OTC treatment options.   Allergy symptoms   Claritin 10 mg BID for several weeks then weekly  Since you have allergy appt set up, wait until that evaluation is  "complete.  Plan: in the event allergist would consider allergy testing, would need to be off antihistamines for a week.    See pt instructions.        BMI:   Estimated body mass index is 28.21 kg/m  as calculated from the following:    Height as of 4/3/19: 1.467 m (4' 9.75\").    Weight as of this encounter: 60.7 kg (133 lb 12.8 oz).   Weight management plan: Discussed healthy diet and exercise guidelines        No follow-ups on file.    Mechelle Montgomery MD  Internal Medicine   [facility]      "

## 2019-08-14 PROBLEM — M54.2 NECK PAIN: Status: RESOLVED | Noted: 2019-04-03 | Resolved: 2019-08-14

## 2019-08-14 NOTE — PROGRESS NOTES
Subjective:  HPI                    Objective:  System    Physical Exam    General     ROS    Assessment/Plan:    DISCHARGE REPORT    Progress reporting period is from 4/3/2019 to 4/15/2019.       SUBJECTIVE  Subjective changes noted by patient:  As of last PT visit on 4/15/2019, pt reported that her shoulder is doing better.  She can reach back a little easier, but still harder than on the R side,  She has a dull ache that is constant at her L upper arm, and increases to 5/10 with certain movements (e.g putting a coat on).  She has not returned for any further PT since then, so current subjective changes are unknown.      OBJECTIVE  Changes noted in objective findings:  The objective findings below are from DOS 4/15/2019.  Objective: L shoulder flex WNL (ERP), abd WNL (ERP), Ext/IR WNL (-), Flex/ER slight loss.  MMT R abd 5/5 (+), flex 5/5, IR 5/5, ER 5/5     ASSESSMENT/PLAN  Assessment of Progress: The patient has not returned to therapy. Current status is unknown.  Self Management Plans:  Patient has been instructed in a home treatment program.    Recommendations:  Pt will be discharged from PT.

## 2019-08-15 ENCOUNTER — TRANSFERRED RECORDS (OUTPATIENT)
Dept: HEALTH INFORMATION MANAGEMENT | Facility: CLINIC | Age: 59
End: 2019-08-15

## 2019-09-04 ENCOUNTER — ALLIED HEALTH/NURSE VISIT (OUTPATIENT)
Dept: NURSING | Facility: CLINIC | Age: 59
End: 2019-09-04
Payer: COMMERCIAL

## 2019-09-04 DIAGNOSIS — Z23 NEED FOR PROPHYLACTIC VACCINATION AND INOCULATION AGAINST INFLUENZA: Primary | ICD-10-CM

## 2019-09-04 PROCEDURE — 90686 IIV4 VACC NO PRSV 0.5 ML IM: CPT

## 2019-09-04 PROCEDURE — 90471 IMMUNIZATION ADMIN: CPT

## 2019-09-04 NOTE — PROGRESS NOTES
"Injectable Influenza Immunization Documentation    1.  Has the patient received the information for the injectable influenza vaccine? YES     2. Is the patient 6 months of age or older? YES     3. Does the patient have any of the following contraindications?         Severe allergy to eggs? No     Severe allergic reaction to previous influenza vaccines? No   Severe allergy to latex? No       History of Guillain-Haslett syndrome? No     Currently have a temperature greater than 100.4F? No        4.  Severely egg allergic patients should have flu vaccine eligibility assessed by an MD, RN, or pharmacist, and those who received flu vaccine should be observed for 15 min by an MD, RN, Pharmacist, Medical Technician, or member of clinic staff.\": YES    5. Latex-allergic patients should be given latex-free influenza vaccine Yes. Please reference the Vaccine latex table to determine if your clinic s product is latex-containing.       Vaccination given by Karin Antunez LPN          "

## 2019-10-03 ENCOUNTER — MYC MEDICAL ADVICE (OUTPATIENT)
Dept: FAMILY MEDICINE | Facility: CLINIC | Age: 59
End: 2019-10-03

## 2019-10-03 RX ORDER — HEPARIN SODIUM (PORCINE) LOCK FLUSH IV SOLN 100 UNIT/ML 100 UNIT/ML
5 SOLUTION INTRAVENOUS
Status: CANCELLED | OUTPATIENT
Start: 2019-10-03

## 2019-10-03 RX ORDER — ZOLEDRONIC ACID 5 MG/100ML
5 INJECTION, SOLUTION INTRAVENOUS ONCE
Status: CANCELLED
Start: 2019-10-03

## 2019-10-03 RX ORDER — HEPARIN SODIUM,PORCINE 10 UNIT/ML
5 VIAL (ML) INTRAVENOUS
Status: CANCELLED | OUTPATIENT
Start: 2019-10-03

## 2019-10-03 NOTE — TELEPHONE ENCOUNTER
Order placed; infusion center to contact patient and set up appt.   Mechelle Montgomery MD  Internal Medicine  electronically signed

## 2019-11-05 ENCOUNTER — HEALTH MAINTENANCE LETTER (OUTPATIENT)
Age: 59
End: 2019-11-05

## 2020-04-05 DIAGNOSIS — E03.9 HYPOTHYROIDISM, UNSPECIFIED TYPE: ICD-10-CM

## 2020-04-08 RX ORDER — LEVOTHYROXINE SODIUM 75 UG/1
TABLET ORAL
Qty: 90 TABLET | Refills: 0 | Status: SHIPPED | OUTPATIENT
Start: 2020-04-08 | End: 2020-04-27

## 2020-04-08 NOTE — TELEPHONE ENCOUNTER
Medication is being filled for 1 time refill only due to:  Due for labs and appt.      Routing to TC's to assist in scheduling/.    Nalini MAXWELL RN

## 2020-04-09 DIAGNOSIS — E78.00 ELEVATED CHOLESTEROL: ICD-10-CM

## 2020-04-09 RX ORDER — ATORVASTATIN CALCIUM 10 MG/1
TABLET, FILM COATED ORAL
Qty: 30 TABLET | Refills: 0 | Status: SHIPPED | OUTPATIENT
Start: 2020-04-09 | End: 2020-04-27

## 2020-04-09 NOTE — TELEPHONE ENCOUNTER
Routing refill request to provider for review/approval because:  Labs not current:  lipid  Patient needs to be seen because it has been more than 1 year since last office visit.  Edel HIDALGON, RN

## 2020-04-09 NOTE — TELEPHONE ENCOUNTER
"Requested Prescriptions   Pending Prescriptions Disp Refills     atorvastatin (LIPITOR) 10 MG tablet [Pharmacy Med Name: ATORVASTATIN 10MG TABLETS]  Last Written Prescription Date:  4/3/19  Last Fill Quantity: 90,  # refills: 3   Last office visit: 8/12/2019 with prescribing provider:  Ulises   Future Office Visit:     90 tablet 3     Sig: TAKE 1 TABLET(10 MG) BY MOUTH DAILY       Statins Protocol Failed - 4/9/2020 10:33 AM        Failed - LDL on file in past 12 months     Recent Labs   Lab Test 04/03/19  0926   LDL 77             Passed - No abnormal creatine kinase in past 12 months     No lab results found.             Passed - Recent (12 mo) or future (30 days) visit within the authorizing provider's specialty     Patient has had an office visit with the authorizing provider or a provider within the authorizing providers department within the previous 12 mos or has a future within next 30 days. See \"Patient Info\" tab in inbasket, or \"Choose Columns\" in Meds & Orders section of the refill encounter.              Passed - Medication is active on med list        Passed - Patient is age 18 or older        Passed - No active pregnancy on record        Passed - No positive pregnancy test in past 12 months             "

## 2020-04-27 ENCOUNTER — VIRTUAL VISIT (OUTPATIENT)
Dept: FAMILY MEDICINE | Facility: CLINIC | Age: 60
End: 2020-04-27
Payer: COMMERCIAL

## 2020-04-27 DIAGNOSIS — Z79.899 CURRENT USE OF PROTON PUMP INHIBITOR: ICD-10-CM

## 2020-04-27 DIAGNOSIS — M81.0 AGE-RELATED OSTEOPOROSIS WITHOUT CURRENT PATHOLOGICAL FRACTURE: ICD-10-CM

## 2020-04-27 DIAGNOSIS — E78.00 ELEVATED CHOLESTEROL: ICD-10-CM

## 2020-04-27 DIAGNOSIS — E03.9 HYPOTHYROIDISM, UNSPECIFIED TYPE: Primary | ICD-10-CM

## 2020-04-27 PROCEDURE — 99214 OFFICE O/P EST MOD 30 MIN: CPT | Mod: 95 | Performed by: INTERNAL MEDICINE

## 2020-04-27 RX ORDER — ATORVASTATIN CALCIUM 10 MG/1
10 TABLET, FILM COATED ORAL DAILY
Qty: 90 TABLET | Refills: 1 | Status: SHIPPED | OUTPATIENT
Start: 2020-04-27 | End: 2020-09-29

## 2020-04-27 RX ORDER — LEVOTHYROXINE SODIUM 75 UG/1
TABLET ORAL
Qty: 90 TABLET | Refills: 1 | Status: SHIPPED | OUTPATIENT
Start: 2020-04-27 | End: 2020-09-29

## 2020-04-27 RX ORDER — RISEDRONATE SODIUM 150 MG/1
150 TABLET, FILM COATED ORAL
Qty: 3 TABLET | Refills: 4 | Status: SHIPPED | OUTPATIENT
Start: 2020-04-27

## 2020-04-27 NOTE — PROGRESS NOTES
"Veronika Lucia is a 59 year old female who is being evaluated via a billable video visit.  After seeral attempt to connect via video visit, it was changed to a billable phone visit.     The patient has been notified of following:     \"This video visit will be conducted via a call between you and your physician/provider. We have found that certain health care needs can be provided without the need for an in-person physical exam.  This service lets us provide the care you need with a video conversation.  If a prescription is necessary we can send it directly to your pharmacy.  If lab work is needed we can place an order for that and you can then stop by our lab to have the test done at a later time.    Video visits are billed at different rates depending on your insurance coverage.  Please reach out to your insurance provider with any questions.    If during the course of the call the physician/provider feels a video visit is not appropriate, you will not be charged for this service.\"    Patient has given verbal consent for Video visit? Yes    How would you like to obtain your AVS? Maryann    Patient would like the video invitation sent by: Text to cell phone: [phone]    Will anyone else be joining your video visit? No      Subjective     Veronika Lucia is a 59 year old female who presents to clinic today for the following health issues:    HPI  Hyperlipidemia Follow-Up      Are you regularly taking any medication or supplement to lower your cholesterol?   Yes- atorvastatin    Are you having muscle aches or other side effects that you think could be caused by your cholesterol lowering medication?  No   Recent Labs   Lab Test 04/03/19  0926 11/02/18  0808  02/12/15  0849 02/14/14  0807   CHOL 152 156   < > 252* 230*   HDL 45* 47*   < > 56 46*   LDL 77 70   < > 150* 151*   TRIG 148 194*   < > 228* 165*   CHOLHDLRATIO  --   --   --  4.5 5.0    < > = values in this interval not displayed.             Hypothyroidism " Follow-up      Since last visit, patient describes the following symptoms: Weight stable, no hair loss, no skin changes, no constipation, no loose stools  Lab Results   Component Value Date    TSH 1.47 04/03/2019             How many servings of fruits and vegetables do you eat daily?  2-3    On average, how many sweetened beverages do you drink each day (Examples: soda, juice, sweet tea, etc.  Do NOT count diet or artificially sweetened beverages)?   1    How many days per week do you exercise enough to make your heart beat faster? 3 or less    How many minutes a day do you exercise enough to make your heart beat faster? 10 - 19    How many days per week do you miss taking your medication? 0         Video Start Time: 11:25 AM    Patient Active Problem List   Diagnosis     Hypothyroidism     Breast cancer (H)     CARDIOVASCULAR SCREENING; LDL GOAL LESS THAN 160     Vitamin D deficiency     Curran's esophagus     GERD (gastroesophageal reflux disease)     Osteoporosis     Acute left-sided low back pain with left-sided sciatica     Acute pain of left shoulder     Elevated cholesterol     Prediabetes     Current use of proton pump inhibitor     Past Surgical History:   Procedure Laterality Date     HYSTERECTOMY TOTAL ABDOMINAL, BILATERAL SALPINGO-OOPHORECTOMY, COMBINED  2010    Dr. Turner fibroids; [facility]     obgyn  2007    right sided mastectomy        Social History     Tobacco Use     Smoking status: Never Smoker     Smokeless tobacco: Never Used   Substance Use Topics     Alcohol use: No     Family History   Problem Relation Age of Onset     Cancer Mother         oral cancer     Hyperlipidemia Brother      Hyperlipidemia Sister          Current Outpatient Medications   Medication Sig Dispense Refill     atorvastatin (LIPITOR) 10 MG tablet Take 1 tablet (10 mg) by mouth daily 90 tablet 1     LANsoprazole (PREVACID) 15 MG CR capsule Take 30 mg by mouth       levothyroxine (SYNTHROID/LEVOTHROID) 75 MCG tablet TAKE 1  "TABLET(75 MCG) BY MOUTH DAILY 90 tablet 1     order for DME Equipment being ordered: prosthesis and  post mastectomy bras  Please clarify quantity availability; pt requesting 6 2 each 1     RISEdronate (ACTONEL) 150 MG tablet Take 1 tablet (150 mg) by mouth every 30 days 3 tablet 4     Allergies   Allergen Reactions     Hydrocodone Hives and Itching     Recent Labs   Lab Test 04/03/19  0926 11/02/18  0808 06/06/18  1701 04/02/18  0921   A1C 5.8*  --  5.7*  --    LDL 77 70  --  192*   HDL 45* 47*  --  41*   TRIG 148 194*  --  231*   ALT 26 36  --  22   CR 0.74 0.78  --  0.82   GFRESTIMATED 88 75  --  71   GFRESTBLACK >90 >90  --  86   POTASSIUM 4.0 4.2  --  4.1   TSH 1.47  --   --  3.44      BP Readings from Last 3 Encounters:   08/12/19 112/74   04/03/19 108/64   06/06/18 118/78    Wt Readings from Last 3 Encounters:   08/12/19 60.7 kg (133 lb 12.8 oz)   04/03/19 59.7 kg (131 lb 9.6 oz)   06/06/18 59.9 kg (132 lb)                    Reviewed and updated as needed this visit by Provider  Tobacco  Allergies  Meds  Problems  Med Hx  Surg Hx  Fam Hx         Review of Systems   ROS COMP: CONSTITUTIONAL: NEGATIVE for fever, chills, change in weight  ENT/MOUTH: NEGATIVE for ear, mouth and throat problems  RESP: NEGATIVE for significant cough or SOB  CV: NEGATIVE for chest pain, palpitations or peripheral edema  GI: taking daily PPI; past concern for Curran's esophagus  NEURO: NEGATIVE for weakness, dizziness or paresthesias  ENDOCRINE: reviewed use of thyroid, lipids and osteoporosis; past limited trial of bisphosphonate therapy; she is interested in another trial but would like to try monthly medication  PSYCHIATRIC: NEGATIVE for changes in mood or affect      Objective    There were no vitals taken for this visit.  Estimated body mass index is 28.21 kg/m  as calculated from the following:    Height as of 4/3/19: 1.467 m (4' 9.75\").    Weight as of 8/12/19: 60.7 kg (133 lb 12.8 oz).  Physical Exam     healthy, " alert and no distress  PSYCH: Alert and oriented times 3; coherent speech, normal   rate and volume, able to articulate logical thoughts, able   to abstract reason, no tangential thoughts, no hallucinations   or delusions  Her affect is normal  RESP: No cough, no audible wheezing, able to talk in full sentences  Remainder of exam unable to be completed due to telephone visits       Diagnostic Test Results:  Labs reviewed in Epic     Reviewed but due to update.    Assessment & Plan     (E03.9) Hypothyroidism, unspecified type  (primary encounter diagnosis)  Comment: clinically euthyroid but limited by   Plan: levothyroxine (SYNTHROID/LEVOTHROID) 75 MCG         tablet        Due for labs    (E78.00) Elevated cholesterol  Comment: pt present with strong FH of high cholesterol;  Labs due, update future lab orders; if similar results, continue the same; would not lower to Atorvastatin 5 mg unless LDL <40  Plan: atorvastatin (LIPITOR) 10 MG tablet         (M81.0) Age-related osteoporosis without current pathological fracture  Comment: reviewed past trial ( Brief) with alendronate. She expresses concerns related to bisphosphonate therapy  Plan: RISEdronate (ACTONEL) 150 MG tablet        Dame dosing recommendations including ; taking on an empty stomach, with full glass of water and not lie down for 30-60 minutes; vit D maricruz august    (Z79.899) Current use of proton pump inhibitor  Comment: chronic PPI use, past EGD raised concerns about possible Curran's Esophagus.   Plan: Pt reminded of benefits of maximizing calcium and vit D to help with bone health (due to being on PPI)        Work on weight loss  Regular exercise  Healthy exercise    Return in about 7 weeks (around 6/15/2020) for cholesterol, Thyroid, osteoporosis; limited refills. .    Mechelle Montgomery MD  Internal Medicine   [facility] [place]      Video-Visit Details    Type of service:  Video Visit    Video End Time:technical challenges; needed to  change to     Originating Location (pt. Location): Home    Distant Location (provider location):  [facility] [place]     Mode of Communication:  Video Conference via Pharnext- technical difficulties, tried Doximity as well and uptimately converted o    Return in about 7 weeks (around 6/15/2020) for cholesterol, Thyroid, osteoporosis; limited refills. .       Mechelle Montgomery MD  Internal Medicine  electronically signed       Telephone visit: 20 minutes.

## 2020-06-29 ENCOUNTER — TELEPHONE (OUTPATIENT)
Dept: FAMILY MEDICINE | Facility: CLINIC | Age: 60
End: 2020-06-29

## 2020-06-29 ENCOUNTER — OFFICE VISIT (OUTPATIENT)
Dept: FAMILY MEDICINE | Facility: CLINIC | Age: 60
End: 2020-06-29
Payer: COMMERCIAL

## 2020-06-29 VITALS
WEIGHT: 135.9 LBS | HEART RATE: 73 BPM | SYSTOLIC BLOOD PRESSURE: 114 MMHG | OXYGEN SATURATION: 100 % | RESPIRATION RATE: 14 BRPM | DIASTOLIC BLOOD PRESSURE: 84 MMHG | BODY MASS INDEX: 28.65 KG/M2 | TEMPERATURE: 98.3 F

## 2020-06-29 DIAGNOSIS — Z90.11 S/P MASTECTOMY, RIGHT: ICD-10-CM

## 2020-06-29 DIAGNOSIS — N64.4 BREAST PAIN: Primary | ICD-10-CM

## 2020-06-29 PROCEDURE — 99214 OFFICE O/P EST MOD 30 MIN: CPT | Performed by: NURSE PRACTITIONER

## 2020-06-29 NOTE — TELEPHONE ENCOUNTER
Reason for call:  Other   Patient called regarding (reason for call): appointment/referrral   Additional comments: Pt needs an order for a diagnostic breast exam. Requesting a same day F2f appointment.     Phone number to reach patient:  Cell number on file:    Telephone Information:   Mobile [phone]       Best Time:  asap    Can we leave a detailed message on this number?  YES    Travel screening: Not Applicable     Suze Reardon on 6/29/2020 at 11:03 AM

## 2020-06-29 NOTE — PROGRESS NOTES
Orlin Lucia is a 59 year old female who presents to clinic today for the following health issues:    HPI   Breast pain      Duration: few days    Description (location/character/radiation): bilateral breast pain (outer breast)    Intensity:  mild    Accompanying signs and symptoms: None    History (similar episodes/previous evaluation): None    Precipitating or alleviating factors: None    Therapies tried and outcome: Tylenol- somewhat effective.      Symptoms over the past week.  Breast pain, L side also in axillary area.  Also notes axillary pain in R side as well.  She was treated for breast cancer 13 years ago with tamoxifen.  S/p mastectomy.  She has L sided mammograms regularly.  She is otherwise well.  No energy changes, weight changes, fevers.  She is leaving on a trip for 3 weeks in 1 week.    Patient Active Problem List   Diagnosis     Hypothyroidism     Breast cancer (H)     CARDIOVASCULAR SCREENING; LDL GOAL LESS THAN 160     Vitamin D deficiency     Curran's esophagus     GERD (gastroesophageal reflux disease)     Osteoporosis     Acute left-sided low back pain with left-sided sciatica     Acute pain of left shoulder     Elevated cholesterol     Prediabetes     Current use of proton pump inhibitor     Past Surgical History:   Procedure Laterality Date     HYSTERECTOMY TOTAL ABDOMINAL, BILATERAL SALPINGO-OOPHORECTOMY, COMBINED  2010    Dr. Turner fibroids; [facility]     obgyn  2007    right sided mastectomy        Social History     Tobacco Use     Smoking status: Never Smoker     Smokeless tobacco: Never Used   Substance Use Topics     Alcohol use: No     Family History   Problem Relation Age of Onset     Cancer Mother         oral cancer     Hyperlipidemia Brother      Hyperlipidemia Sister      Breast Cancer Sister              Reviewed and updated as needed this visit by Provider         Review of Systems   Constitutional, HEENT, cardiovascular, pulmonary, gi and gu systems are negative,  except as otherwise noted.      Objective    /84 (BP Location: Right arm, Patient Position: Chair, Cuff Size: Adult Regular)   Pulse 73   Temp 98.3  F (36.8  C) (Oral)   Resp 14   Wt 61.6 kg (135 lb 14.4 oz)   LMP  (LMP Unknown)   SpO2 100%   BMI 28.65 kg/m    Body mass index is 28.65 kg/m .  Physical Exam   GENERAL: healthy, alert and no distress  RESP: lungs clear to auscultation - no rales, rhonchi or wheezes  BREAST: normal without masses, tenderness or nipple discharge and no palpable axillary masses or adenopathy  CV: regular rate and rhythm, normal S1 S2, no S3 or S4, no murmur, click or rub, no peripheral edema and peripheral pulses strong  PSYCH: mentation appears normal, affect normal/bright    Diagnostic Test Results:  none         Assessment & Plan     1. Breast pain  - MA Diagnostic Digital Left; Future  - US Breast Left Complete 4 Quadrants; Future  - US Breast Right Complete 4 Quadrants; Future    2. S/P mastectomy, right  - MA Diagnostic Digital Left; Future  - US Breast Left Complete 4 Quadrants; Future  - US Breast Right Complete 4 Quadrants; Future      Return in about 1 week (around 7/6/2020) for follow up.    GEORGINA Dupree Ra, CNP  [facility]

## 2020-06-30 ENCOUNTER — HOSPITAL ENCOUNTER (OUTPATIENT)
Dept: ULTRASOUND IMAGING | Facility: CLINIC | Age: 60
End: 2020-06-30
Attending: NURSE PRACTITIONER
Payer: COMMERCIAL

## 2020-06-30 ENCOUNTER — HOSPITAL ENCOUNTER (OUTPATIENT)
Dept: MAMMOGRAPHY | Facility: CLINIC | Age: 60
End: 2020-06-30
Attending: NURSE PRACTITIONER
Payer: COMMERCIAL

## 2020-06-30 DIAGNOSIS — N64.4 BREAST PAIN: ICD-10-CM

## 2020-06-30 DIAGNOSIS — Z90.11 S/P MASTECTOMY, RIGHT: ICD-10-CM

## 2020-06-30 PROCEDURE — 76642 ULTRASOUND BREAST LIMITED: CPT | Mod: 50

## 2020-06-30 PROCEDURE — G0279 TOMOSYNTHESIS, MAMMO: HCPCS

## 2020-09-08 ENCOUNTER — TELEPHONE (OUTPATIENT)
Dept: FAMILY MEDICINE | Facility: CLINIC | Age: 60
End: 2020-09-08

## 2020-09-08 DIAGNOSIS — Z00.00 ROUTINE HISTORY AND PHYSICAL EXAMINATION OF ADULT: Primary | ICD-10-CM

## 2020-09-08 NOTE — TELEPHONE ENCOUNTER
Reason for call:  Requesting lab orders  Patient called regarding (reason for call): Wants lab orders for her annual physical put in her chart so she can do them before her appt.   Additional comments: na    Phone number to reach patient:  Cell number on file:    Telephone Information:   Mobile [phone]       Best Time:  anytime    Can we leave a detailed message on this number?  YES    Travel screening: Not Applicable

## 2020-09-23 ENCOUNTER — MYC MEDICAL ADVICE (OUTPATIENT)
Dept: FAMILY MEDICINE | Facility: CLINIC | Age: 60
End: 2020-09-23

## 2020-09-24 DIAGNOSIS — Z00.00 ROUTINE HISTORY AND PHYSICAL EXAMINATION OF ADULT: ICD-10-CM

## 2020-09-24 LAB
ALBUMIN SERPL-MCNC: 3.7 G/DL (ref 3.4–5)
ALP SERPL-CCNC: 74 U/L (ref 40–150)
ALT SERPL W P-5'-P-CCNC: 35 U/L (ref 0–50)
ANION GAP SERPL CALCULATED.3IONS-SCNC: 3 MMOL/L (ref 3–14)
AST SERPL W P-5'-P-CCNC: 19 U/L (ref 0–45)
BILIRUB SERPL-MCNC: 0.4 MG/DL (ref 0.2–1.3)
BUN SERPL-MCNC: 10 MG/DL (ref 7–30)
CALCIUM SERPL-MCNC: 8.9 MG/DL (ref 8.5–10.1)
CHLORIDE SERPL-SCNC: 108 MMOL/L (ref 94–109)
CHOLEST SERPL-MCNC: 164 MG/DL
CO2 SERPL-SCNC: 26 MMOL/L (ref 20–32)
CREAT SERPL-MCNC: 0.9 MG/DL (ref 0.52–1.04)
GFR SERPL CREATININE-BSD FRML MDRD: 69 ML/MIN/{1.73_M2}
GLUCOSE SERPL-MCNC: 103 MG/DL (ref 70–99)
HBA1C MFR BLD: 6 % (ref 0–5.6)
HDLC SERPL-MCNC: 44 MG/DL
LDLC SERPL CALC-MCNC: 86 MG/DL
NONHDLC SERPL-MCNC: 120 MG/DL
POTASSIUM SERPL-SCNC: 4 MMOL/L (ref 3.4–5.3)
PROT SERPL-MCNC: 7.7 G/DL (ref 6.8–8.8)
SODIUM SERPL-SCNC: 137 MMOL/L (ref 133–144)
T4 FREE SERPL-MCNC: 1.02 NG/DL (ref 0.76–1.46)
TRIGL SERPL-MCNC: 171 MG/DL
TSH SERPL DL<=0.005 MIU/L-ACNC: 5.55 MU/L (ref 0.4–4)

## 2020-09-24 PROCEDURE — 83036 HEMOGLOBIN GLYCOSYLATED A1C: CPT | Performed by: INTERNAL MEDICINE

## 2020-09-24 PROCEDURE — 84443 ASSAY THYROID STIM HORMONE: CPT | Performed by: INTERNAL MEDICINE

## 2020-09-24 PROCEDURE — 36415 COLL VENOUS BLD VENIPUNCTURE: CPT | Performed by: INTERNAL MEDICINE

## 2020-09-24 PROCEDURE — 80061 LIPID PANEL: CPT | Performed by: INTERNAL MEDICINE

## 2020-09-24 PROCEDURE — 84439 ASSAY OF FREE THYROXINE: CPT | Performed by: INTERNAL MEDICINE

## 2020-09-24 PROCEDURE — 80053 COMPREHEN METABOLIC PANEL: CPT | Performed by: INTERNAL MEDICINE

## 2020-09-24 NOTE — TELEPHONE ENCOUNTER
Patient came into the office. I printed the forms and then gave them back to the patient as she is having a PHY 9/29/20 at which time they can do her height, weight etc..... and fill out at appt and fax.

## 2020-09-29 ENCOUNTER — OFFICE VISIT (OUTPATIENT)
Dept: FAMILY MEDICINE | Facility: CLINIC | Age: 60
End: 2020-09-29
Payer: COMMERCIAL

## 2020-09-29 VITALS
BODY MASS INDEX: 28.82 KG/M2 | HEART RATE: 62 BPM | WEIGHT: 137.3 LBS | SYSTOLIC BLOOD PRESSURE: 116 MMHG | RESPIRATION RATE: 16 BRPM | OXYGEN SATURATION: 100 % | HEIGHT: 58 IN | DIASTOLIC BLOOD PRESSURE: 68 MMHG | TEMPERATURE: 98.1 F

## 2020-09-29 DIAGNOSIS — E03.9 HYPOTHYROIDISM, UNSPECIFIED TYPE: ICD-10-CM

## 2020-09-29 DIAGNOSIS — R73.03 PREDIABETES: ICD-10-CM

## 2020-09-29 DIAGNOSIS — M81.0 AGE-RELATED OSTEOPOROSIS WITHOUT CURRENT PATHOLOGICAL FRACTURE: ICD-10-CM

## 2020-09-29 DIAGNOSIS — E55.9 VITAMIN D DEFICIENCY: ICD-10-CM

## 2020-09-29 DIAGNOSIS — E78.00 ELEVATED CHOLESTEROL: ICD-10-CM

## 2020-09-29 DIAGNOSIS — Z17.0 MALIGNANT NEOPLASM OF RIGHT BREAST IN FEMALE, ESTROGEN RECEPTOR POSITIVE, UNSPECIFIED SITE OF BREAST (H): ICD-10-CM

## 2020-09-29 DIAGNOSIS — C50.911 MALIGNANT NEOPLASM OF RIGHT BREAST IN FEMALE, ESTROGEN RECEPTOR POSITIVE, UNSPECIFIED SITE OF BREAST (H): ICD-10-CM

## 2020-09-29 DIAGNOSIS — Z00.00 ROUTINE GENERAL MEDICAL EXAMINATION AT A HEALTH CARE FACILITY: Primary | ICD-10-CM

## 2020-09-29 DIAGNOSIS — Z23 NEED FOR PROPHYLACTIC VACCINATION AND INOCULATION AGAINST INFLUENZA: ICD-10-CM

## 2020-09-29 PROCEDURE — 90471 IMMUNIZATION ADMIN: CPT | Performed by: INTERNAL MEDICINE

## 2020-09-29 PROCEDURE — 99396 PREV VISIT EST AGE 40-64: CPT | Mod: 25 | Performed by: INTERNAL MEDICINE

## 2020-09-29 PROCEDURE — 90682 RIV4 VACC RECOMBINANT DNA IM: CPT | Performed by: INTERNAL MEDICINE

## 2020-09-29 PROCEDURE — 99214 OFFICE O/P EST MOD 30 MIN: CPT | Mod: 25 | Performed by: INTERNAL MEDICINE

## 2020-09-29 RX ORDER — LEVOTHYROXINE SODIUM 75 UG/1
TABLET ORAL
Qty: 90 TABLET | Refills: 3 | Status: SHIPPED | OUTPATIENT
Start: 2020-09-29

## 2020-09-29 RX ORDER — ATORVASTATIN CALCIUM 10 MG/1
10 TABLET, FILM COATED ORAL DAILY
Qty: 90 TABLET | Refills: 3 | Status: SHIPPED | OUTPATIENT
Start: 2020-09-29

## 2020-09-29 ASSESSMENT — ENCOUNTER SYMPTOMS
DIARRHEA: 0
HEMATOCHEZIA: 0
COUGH: 0
CHILLS: 0
CONSTIPATION: 0
ABDOMINAL PAIN: 0
HEMATURIA: 0
DIZZINESS: 0

## 2020-09-29 ASSESSMENT — MIFFLIN-ST. JEOR: SCORE: 1064.6

## 2020-09-29 NOTE — PROGRESS NOTES
Chief Complaint   Patient presents with     Physical     Lipids     Thyroid Problem     Imm/Inj     Flu Shot        SUBJECTIVE:   CC: Veronika Lucia is an 60 year old woman who presents for preventive health visit.       Patient has been advised of split billing requirements and indicates understanding: Yes  Healthy Habits:     Getting at least 3 servings of Calcium per day:  Yes    Bi-annual eye exam:  Yes    Dental care twice a year:  Yes    Sleep apnea or symptoms of sleep apnea:  None    Diet:  Vegetarian/vegan    Frequency of exercise:  2-3 days/week    Duration of exercise:  15-30 minutes    Taking medications regularly:  Yes    Barriers to taking medications:  None    Medication side effects:  None    PHQ-2 Total Score: 0    Additional concerns today:  No        Hyperlipidemia Follow-Up      Are you regularly taking any medication or supplement to lower your cholesterol?   Yes- atorvastatin    Are you having muscle aches or other side effects that you think could be caused by your cholesterol lowering medication?  No    Hypothyroidism Follow-up      Since last visit, patient describes the following symptoms: Weight stable, no hair loss, no skin changes, no constipation, no loose stools      Today's PHQ-2 Score:   PHQ-2 ( 1999 Pf[employer]) 9/29/2020   Q1: Little interest or pleasure in doing things 0   Q2: Feeling down, depressed or hopeless 0   PHQ-2 Score 0   Q1: Little interest or pleasure in doing things Not at all   Q2: Feeling down, depressed or hopeless Not at all   PHQ-2 Score 0       Abuse: Current or Past (Physical, Sexual or Emotional) - No  Do you feel safe in your environment? Yes    Have you ever done Advance Care Planning? (For example, a Health Directive, POLST, or a discussion with a medical provider or your loved ones about your wishes): Yes, patient states has an Advance Care Planning document and will bring a copy to the clinic.    Social History     Tobacco Use     Smoking status: Never Smoker      Smokeless tobacco: Never Used   Substance Use Topics     Alcohol use: No         Alcohol Use 9/29/2020   Prescreen: >3 drinks/day or >7 drinks/week? Not Applicable   Prescreen: >3 drinks/day or >7 drinks/week? -       Reviewed orders with patient.  Reviewed health maintenance and updated orders accordingly - Yes  Labs reviewed in EPIC  BP Readings from Last 3 Encounters:   09/29/20 116/68   06/29/20 114/84   08/12/19 112/74    Wt Readings from Last 3 Encounters:   09/29/20 62.3 kg (137 lb 4.8 oz)   06/29/20 61.6 kg (135 lb 14.4 oz)   08/12/19 60.7 kg (133 lb 12.8 oz)                  Patient Active Problem List   Diagnosis     Hypothyroidism     Breast cancer (H)     CARDIOVASCULAR SCREENING; LDL GOAL LESS THAN 160     Vitamin D deficiency     Curran's esophagus     GERD (gastroesophageal reflux disease)     Osteoporosis     Acute left-sided low back pain with left-sided sciatica     Acute pain of left shoulder     Elevated cholesterol     Prediabetes     Current use of proton pump inhibitor     Past Surgical History:   Procedure Laterality Date     HYSTERECTOMY TOTAL ABDOMINAL, BILATERAL SALPINGO-OOPHORECTOMY, COMBINED  2010    Dr. Turner fibroids; [facility]     obgyn  2007    right sided mastectomy        Social History     Tobacco Use     Smoking status: Never Smoker     Smokeless tobacco: Never Used   Substance Use Topics     Alcohol use: No     Family History   Problem Relation Age of Onset     Cancer Mother         oral cancer     Hyperlipidemia Brother      Hyperlipidemia Sister      Breast Cancer Sister          Current Outpatient Medications   Medication Sig Dispense Refill     atorvastatin (LIPITOR) 10 MG tablet Take 1 tablet (10 mg) by mouth daily 90 tablet 3     Cholecalciferol 125 MCG (5000 UT) TABS Take 1 tablet (0.125 mg) by mouth daily 100 tablet 4     LANsoprazole (PREVACID) 15 MG CR capsule Take 30 mg by mouth       levothyroxine (SYNTHROID/LEVOTHROID) 75 MCG tablet TAKE 1 TABLET(75 MCG) BY MOUTH  DAILY 90 tablet 3     order for DME Equipment being ordered: prosthesis and  post mastectomy bras  Please clarify quantity availability; pt requesting 6 2 each 1     RISEdronate (ACTONEL) 150 MG tablet Take 1 tablet (150 mg) by mouth every 30 days 3 tablet 4     Allergies   Allergen Reactions     Hydrocodone Hives and Itching     Recent Labs   Lab Test 09/24/20  0810 04/03/19  0926 11/02/18  0808 06/06/18  1701   A1C 6.0* 5.8*  --  5.7*   LDL 86 77 70  --    HDL 44* 45* 47*  --    TRIG 171* 148 194*  --    ALT 35 26 36  --    CR 0.90 0.74 0.78  --    GFRESTIMATED 69 88 75  --    GFRESTBLACK 80 >90 >90  --    POTASSIUM 4.0 4.0 4.2  --    TSH 5.55* 1.47  --   --         Mammogram Screening: Patient over age 50, mutual decision to screen reflected in health maintenance.    Pertinent mammograms are reviewed under the imaging tab.  History of abnormal Pap smear: NO - age 30-65 PAP every 5 years with negative HPV co-testing recommended     Reviewed and updated as needed this visit by clinical staff  Tobacco  Allergies  Meds  Problems  Med Hx  Surg Hx  Fam Hx  Soc Hx          Reviewed and updated as needed this visit by Provider  Tobacco  Allergies  Meds  Problems  Med Hx  Surg Hx  Fam Hx         Past Medical History:   Diagnosis Date     Breast cancer (H) 2007    right mastectomy, Tamoxifen for 5 years; ; Dr. Denton     DVT (deep venous thrombosis) (H) 2009    left distal leg; following fracture/immobility; was also on Tamoxifen.     Thyroid disease 2000    Replacement therapy      Past Surgical History:   Procedure Laterality Date     HYSTERECTOMY TOTAL ABDOMINAL, BILATERAL SALPINGO-OOPHORECTOMY, COMBINED  2010    Dr. Turner fibroids; [facility]     obgyn  2007    right sided mastectomy        Review of Systems   Constitutional: Negative for chills.   HENT: Negative for congestion.    Respiratory: Negative for cough.    Cardiovascular: Negative for chest pain.   Gastrointestinal: Negative for abdominal pain,  "constipation, diarrhea and hematochezia.        Hx possible Curran's Esophagus; on PPI, sees GI in [place] ( family member)   Breasts:         2/2007  DCIS, Stage 1; right mastecotmy; Tamoxifen 5 years;  MOHPA  ER/MI + Her2 -, FISH   Genitourinary: Negative for hematuria.   Neurological: Negative for dizziness.          OBJECTIVE:   /68   Pulse 62   Temp 98.1  F (36.7  C) (Oral)   Resp 16   Ht 1.461 m (4' 9.5\")   Wt 62.3 kg (137 lb 4.8 oz)   LMP  (LMP Unknown)   SpO2 100%   BMI 29.20 kg/m    Physical Exam  GENERAL: healthy, alert and no distress  EYES: Eyes grossly normal to inspection  HENT: ear canals and TM's normal, nose and mouth without ulcers or lesions  NECK: no adenopathy, no asymmetry, masses, or scars and thyroid normal to palpation  RESP: lungs clear to auscultation - no rales, rhonchi or wheezes  BREAST: normal without masses, tenderness or nipple discharge and no palpable axillary masses or adenopathy  CV: regular rate and rhythm, normal S1 S2, no S3 or S4, no murmur, click or rub, no peripheral edema and peripheral pulses strong  ABDOMEN: soft, nontender, no hepatosplenomegaly, no masses and bowel sounds normal  MS: no gross musculoskeletal defects noted, no edema  NEURO: Normal strength and tone, mentation intact and speech normal  PSYCH: mentation appears normal, affect normal/bright      Diagnostic Test Results:  Labs reviewed in Epic    ASSESSMENT/PLAN:   (Z00.00) Routine general medical examination at a health care facility  (primary encounter diagnosis)  Comment: HEALTH CARE MAINTENANCE reviewed  Plan: has insurance form to add labs and fax for her; original will be sent to pt    (E78.00) Elevated cholesterol  Comment: pt present with strong FH of high cholesterol;  Labs due today; if similar results, continue the same; would not lower to Atorvastatin 5 mg unless LDL <40  Plan: atorvastatin (LIPITOR) 10 MG tablet          (E03.9) Hypothyroidism, unspecified type  Comment: " "Clinically euthyroid; labs today, consider dose adjustment if labs warrant   Plan: levothyroxine (SYNTHROID/LEVOTHROID) 75 MCG         tablet, TSH with free T4 reflex          (R73.03) Prediabetes  Comment: montior; diet and exercise; she would like to check in 3-6 months; if A1C>6, consider Metformin use.   Plan: Hemoglobin A1c        Keep active and exercise regularly    (E55.9) Vitamin D deficiency  Comment: VIT D low; intermittently taking Vit D supplement; rec D3 5000 IU daily  Plan: Vitamin D Deficiency, Cholecalciferol 125 MCG         (5000 UT) TABS         (M81.0) Age-related osteoporosis without current pathological fracture  Comment: Osteoporosis on DEXA; initially pt preferred exercise, vit D and Ca; 4/20 decided to take Risedronate monthly;  Dexa  due 5/2022  Plan: keep active and no change in medications    (C50.911,  Z17.0) Malignant neoplasm of right breast in female, estrogen receptor positive, unspecified site of breast (H)  Comment: 2/2007  DCIS, Stage 1; right mastecotmy; Tamoxifen 5 years;  [facility]  ER/NM + Her2 -, FISH  Plan:     (Z23) Need for prophylactic vaccination and inoculation against influenza  Comment: Pt request. CDC guidelines met   Plan: INFLUENZA QUAD, RECOMBINANT, P-FREE (RIV4)         (FLUBLOCK) [83073], Vaccine Administration,         Initial [74984]            Patient has been advised of split billing requirements and indicates understanding: Yes  COUNSELING:  Reviewed preventive health counseling, as reflected in patient instructions       Regular exercise       Healthy diet/nutrition       Immunizations    Vaccinated for: Influenza         Osteoporosis Prevention/Bone Health- DEXA 5/2022    Estimated body mass index is 28.65 kg/m  as calculated from the following:    Height as of 4/3/19: 1.467 m (4' 9.75\").    Weight as of 6/29/20: 61.6 kg (135 lb 14.4 oz).    Weight management plan: Discussed healthy diet and exercise guidelines    She reports that she has never smoked. She " has never used smokeless tobacco.      Counseling Resources:  ATP IV Guidelines  Pooled Cohorts Equation Calculator  Breast Cancer Risk Calculator  BRCA-Related Cancer Risk Assessment: FHS-7 Tool  FRAX Risk Assessment  ICSI Preventive Guidelines  Dietary Guidelines for Americans, 2010  [employer]'s MyPlate  ASA Prophylaxis  Lung CA Screening    Mechelle Montgomery MD  Internal Medicine   [facility] [place]    25 minutes in addition to HEALTH CARE MAINTENANCE are spent with patient, over 50% of that time spent providing counselling, discussing and reviewing medical conditions/concerns, meds and potential side effects.

## 2020-10-13 ENCOUNTER — MYC MEDICAL ADVICE (OUTPATIENT)
Dept: FAMILY MEDICINE | Facility: CLINIC | Age: 60
End: 2020-10-13

## 2020-10-13 NOTE — TELEPHONE ENCOUNTER
Tried calling to see if she got our message and if she would like to  the paperwork or have us fax without signature or have her come and sign and then we fax, etc.     No answer, no voicemail.     Nalini MAXWELL RN     (in Ama[employer] bin waiting for a response)

## 2021-01-21 ENCOUNTER — TELEPHONE (OUTPATIENT)
Dept: FAMILY MEDICINE | Facility: CLINIC | Age: 61
End: 2021-01-21

## 2021-01-21 DIAGNOSIS — M81.0 AGE-RELATED OSTEOPOROSIS WITHOUT CURRENT PATHOLOGICAL FRACTURE: Primary | ICD-10-CM

## 2021-01-22 NOTE — TELEPHONE ENCOUNTER
Prior Authorization Retail Medication Request    Medication/Dose: Risedronate Sodium 150 mg tablets  take 1 tablet monthly   ICD code (if different than what is on RX):    Previously Tried and Failed:  Fosamax once weekly   Rationale:  Pt has been on Risedronate Sod for the past year and is tolerating well without side effects.     Insurance Name:  [employer]  Insurance ID:  [id number]       Pharmacy Information (if different than what is on RX)  Name:    Phone:

## 2021-01-25 RX ORDER — IBANDRONATE SODIUM 150 MG/1
150 TABLET, FILM COATED ORAL
Qty: 3 TABLET | Refills: 4 | Status: SHIPPED | OUTPATIENT
Start: 2021-01-25

## 2021-01-25 NOTE — TELEPHONE ENCOUNTER
PRIOR AUTHORIZATION DENIED    Medication: Risedronate Sodium 150 mg tablets    Denial Date: 1/25/2021    Denial Rational: Patient has to try/fail BOTH alendronate AND ibandronate.      Appeal Information: If provider would like to appeal this decision we will need a detailed letter of medical necessity to start the process. Then re-route this request back to the PA pool.

## 2021-01-25 NOTE — TELEPHONE ENCOUNTER
PA Initiation    Medication: Risedronate Sodium 150 mg tablets  Insurance Company: [employer] ([facility]) - Phone [phone] Fax [phone]  Pharmacy Filling the Rx: [id number] - [place], [place] - [address] AT [place] [address] & [facility]  Filling Pharmacy Phone: [phone]  Filling Pharmacy Fax: [phone]  Start Date: 1/25/2021

## 2021-01-26 NOTE — TELEPHONE ENCOUNTER
Chart reviewed,  Pt has been on Alendronate and reports GI symptoms.  I do not see that Ibandronate has been tried at all.    Other options:  Could try Reclast yearly infusion instead if desired.     Rx sent to pharmacy for Ibandronate  therapy    Mechelle Montgomery MD  Internal Medicine  electronically signed

## 2021-01-26 NOTE — TELEPHONE ENCOUNTER
Spoke with patient relayed below message. Pt states understanding and will call back with any questions.

## 2021-09-19 ENCOUNTER — HEALTH MAINTENANCE LETTER (OUTPATIENT)
Age: 61
End: 2021-09-19

## 2021-11-14 ENCOUNTER — HEALTH MAINTENANCE LETTER (OUTPATIENT)
Age: 61
End: 2021-11-14

## 2022-11-21 ENCOUNTER — HEALTH MAINTENANCE LETTER (OUTPATIENT)
Age: 62
End: 2022-11-21

## 2023-09-16 ENCOUNTER — HEALTH MAINTENANCE LETTER (OUTPATIENT)
Age: 63
End: 2023-09-16

## 2024-02-03 ENCOUNTER — HEALTH MAINTENANCE LETTER (OUTPATIENT)
Age: 64
End: 2024-02-03